# Patient Record
Sex: FEMALE | Race: WHITE | Employment: FULL TIME | URBAN - NONMETROPOLITAN AREA
[De-identification: names, ages, dates, MRNs, and addresses within clinical notes are randomized per-mention and may not be internally consistent; named-entity substitution may affect disease eponyms.]

---

## 2019-05-17 LAB — MAMMOGRAPHY, EXTERNAL: NORMAL

## 2021-09-02 ENCOUNTER — TELEPHONE (OUTPATIENT)
Dept: PRIMARY CARE CLINIC | Age: 51
End: 2021-09-02

## 2021-09-02 ENCOUNTER — OFFICE VISIT (OUTPATIENT)
Dept: PRIMARY CARE CLINIC | Age: 51
End: 2021-09-02
Payer: COMMERCIAL

## 2021-09-02 VITALS
RESPIRATION RATE: 16 BRPM | HEART RATE: 68 BPM | DIASTOLIC BLOOD PRESSURE: 80 MMHG | SYSTOLIC BLOOD PRESSURE: 126 MMHG | TEMPERATURE: 97.5 F | WEIGHT: 293 LBS | HEIGHT: 63 IN | OXYGEN SATURATION: 98 % | BODY MASS INDEX: 51.91 KG/M2

## 2021-09-02 DIAGNOSIS — I10 ESSENTIAL HYPERTENSION: ICD-10-CM

## 2021-09-02 DIAGNOSIS — E03.9 ACQUIRED HYPOTHYROIDISM: ICD-10-CM

## 2021-09-02 DIAGNOSIS — M19.90 ARTHRITIS: ICD-10-CM

## 2021-09-02 DIAGNOSIS — E04.1 THYROID NODULE: ICD-10-CM

## 2021-09-02 DIAGNOSIS — N92.4 EXCESSIVE BLEEDING IN PREMENOPAUSAL PERIOD: ICD-10-CM

## 2021-09-02 DIAGNOSIS — R60.0 LOCALIZED EDEMA: ICD-10-CM

## 2021-09-02 DIAGNOSIS — Z98.890 HX OF LEFT BREAST BIOPSY: ICD-10-CM

## 2021-09-02 DIAGNOSIS — F41.9 ANXIETY: Primary | ICD-10-CM

## 2021-09-02 PROCEDURE — 99204 OFFICE O/P NEW MOD 45 MIN: CPT | Performed by: INTERNAL MEDICINE

## 2021-09-02 PROCEDURE — 93000 ELECTROCARDIOGRAM COMPLETE: CPT | Performed by: INTERNAL MEDICINE

## 2021-09-02 RX ORDER — LISINOPRIL AND HYDROCHLOROTHIAZIDE 25; 20 MG/1; MG/1
1 TABLET ORAL DAILY
COMMUNITY
Start: 2021-07-28 | End: 2021-09-02 | Stop reason: SDUPTHER

## 2021-09-02 RX ORDER — LEVOTHYROXINE SODIUM 0.12 MG/1
125 TABLET ORAL
COMMUNITY
Start: 2021-06-16 | End: 2021-09-02 | Stop reason: SDUPTHER

## 2021-09-02 RX ORDER — LISINOPRIL AND HYDROCHLOROTHIAZIDE 25; 20 MG/1; MG/1
1 TABLET ORAL DAILY
Qty: 90 TABLET | Refills: 1 | Status: SHIPPED
Start: 2021-09-02 | End: 2022-03-01 | Stop reason: SDUPTHER

## 2021-09-02 RX ORDER — LEVOTHYROXINE SODIUM 0.12 MG/1
125 TABLET ORAL
Qty: 90 TABLET | Refills: 1 | Status: SHIPPED
Start: 2021-09-02 | End: 2022-03-01 | Stop reason: SDUPTHER

## 2021-09-02 RX ORDER — NAPROXEN SODIUM 220 MG
220 TABLET ORAL DAILY PRN
COMMUNITY
End: 2022-03-01 | Stop reason: SDUPTHER

## 2021-09-02 SDOH — ECONOMIC STABILITY: TRANSPORTATION INSECURITY
IN THE PAST 12 MONTHS, HAS LACK OF TRANSPORTATION KEPT YOU FROM MEETINGS, WORK, OR FROM GETTING THINGS NEEDED FOR DAILY LIVING?: NO

## 2021-09-02 SDOH — ECONOMIC STABILITY: FOOD INSECURITY: WITHIN THE PAST 12 MONTHS, THE FOOD YOU BOUGHT JUST DIDN'T LAST AND YOU DIDN'T HAVE MONEY TO GET MORE.: NEVER TRUE

## 2021-09-02 SDOH — ECONOMIC STABILITY: FOOD INSECURITY: WITHIN THE PAST 12 MONTHS, YOU WORRIED THAT YOUR FOOD WOULD RUN OUT BEFORE YOU GOT MONEY TO BUY MORE.: NEVER TRUE

## 2021-09-02 SDOH — ECONOMIC STABILITY: HOUSING INSECURITY: IN THE LAST 12 MONTHS, HOW MANY PLACES HAVE YOU LIVED?: 2

## 2021-09-02 SDOH — ECONOMIC STABILITY: HOUSING INSECURITY
IN THE LAST 12 MONTHS, WAS THERE A TIME WHEN YOU DID NOT HAVE A STEADY PLACE TO SLEEP OR SLEPT IN A SHELTER (INCLUDING NOW)?: NO

## 2021-09-02 SDOH — ECONOMIC STABILITY: TRANSPORTATION INSECURITY
IN THE PAST 12 MONTHS, HAS THE LACK OF TRANSPORTATION KEPT YOU FROM MEDICAL APPOINTMENTS OR FROM GETTING MEDICATIONS?: NO

## 2021-09-02 SDOH — ECONOMIC STABILITY: INCOME INSECURITY: IN THE LAST 12 MONTHS, WAS THERE A TIME WHEN YOU WERE NOT ABLE TO PAY THE MORTGAGE OR RENT ON TIME?: NO

## 2021-09-02 SDOH — HEALTH STABILITY: PHYSICAL HEALTH: ON AVERAGE, HOW MANY DAYS PER WEEK DO YOU ENGAGE IN MODERATE TO STRENUOUS EXERCISE (LIKE A BRISK WALK)?: 0 DAYS

## 2021-09-02 SDOH — HEALTH STABILITY: PHYSICAL HEALTH: ON AVERAGE, HOW MANY MINUTES DO YOU ENGAGE IN EXERCISE AT THIS LEVEL?: 0 MIN

## 2021-09-02 ASSESSMENT — PATIENT HEALTH QUESTIONNAIRE - PHQ9
1. LITTLE INTEREST OR PLEASURE IN DOING THINGS: 0
SUM OF ALL RESPONSES TO PHQ9 QUESTIONS 1 & 2: 0
SUM OF ALL RESPONSES TO PHQ QUESTIONS 1-9: 0
2. FEELING DOWN, DEPRESSED OR HOPELESS: 0
DEPRESSION UNABLE TO ASSESS: FUNCTIONAL CAPACITY MOTIVATION LIMITS ACCURACY
SUM OF ALL RESPONSES TO PHQ QUESTIONS 1-9: 0
SUM OF ALL RESPONSES TO PHQ QUESTIONS 1-9: 0

## 2021-09-02 ASSESSMENT — SOCIAL DETERMINANTS OF HEALTH (SDOH)
WITHIN THE LAST YEAR, HAVE TO BEEN RAPED OR FORCED TO HAVE ANY KIND OF SEXUAL ACTIVITY BY YOUR PARTNER OR EX-PARTNER?: NO
HOW OFTEN DO YOU ATTENT MEETINGS OF THE CLUB OR ORGANIZATION YOU BELONG TO?: NEVER
WITHIN THE LAST YEAR, HAVE YOU BEEN HUMILIATED OR EMOTIONALLY ABUSED IN OTHER WAYS BY YOUR PARTNER OR EX-PARTNER?: NO
DO YOU BELONG TO ANY CLUBS OR ORGANIZATIONS SUCH AS CHURCH GROUPS UNIONS, FRATERNAL OR ATHLETIC GROUPS, OR SCHOOL GROUPS?: NO
IN A TYPICAL WEEK, HOW MANY TIMES DO YOU TALK ON THE PHONE WITH FAMILY, FRIENDS, OR NEIGHBORS?: MORE THAN THREE TIMES A WEEK
HOW HARD IS IT FOR YOU TO PAY FOR THE VERY BASICS LIKE FOOD, HOUSING, MEDICAL CARE, AND HEATING?: NOT HARD AT ALL
HOW OFTEN DO YOU ATTEND CHURCH OR RELIGIOUS SERVICES?: 1 TO 4 TIMES PER YEAR
WITHIN THE LAST YEAR, HAVE YOU BEEN KICKED, HIT, SLAPPED, OR OTHERWISE PHYSICALLY HURT BY YOUR PARTNER OR EX-PARTNER?: NO
WITHIN THE LAST YEAR, HAVE YOU BEEN AFRAID OF YOUR PARTNER OR EX-PARTNER?: NO
HOW OFTEN DO YOU GET TOGETHER WITH FRIENDS OR RELATIVES?: NEVER

## 2021-09-02 ASSESSMENT — LIFESTYLE VARIABLES
HOW OFTEN DO YOU HAVE A DRINK CONTAINING ALCOHOL: MONTHLY OR LESS
HOW MANY STANDARD DRINKS CONTAINING ALCOHOL DO YOU HAVE ON A TYPICAL DAY: 3 OR 4

## 2021-09-02 ASSESSMENT — ENCOUNTER SYMPTOMS
RESPIRATORY NEGATIVE: 1
ALLERGIC/IMMUNOLOGIC NEGATIVE: 1
EYES NEGATIVE: 1
GASTROINTESTINAL NEGATIVE: 1

## 2021-09-02 NOTE — TELEPHONE ENCOUNTER
Pt forgot to ask for a referral to a dermatologist for skin tags. Wanted to know if you can also put in for that.

## 2021-09-02 NOTE — PROGRESS NOTES
2021    Isabelle Guardado (:  1970) is a 46 y.o. female, here for evaluation of the following medical concerns: This is a new patient to my practice today. Patient does have a history of hypertension since the age of [de-identified] and hypothyroidism treated since age of [de-identified]. She began having treatment for osteoarthritis especially of the right knee at age 50. Very minimal surgical history. She did see an orthopedic surgeon previously for the knee. Patient did have a breast biopsy but was somewhat traumatized by this. This was done back in 2019. I will need to look at these records. She states occasionally at the biopsy site she still has twinges of discomfort. She did not have thyroid work-up done when she was [age]. She was just placed on medication. Review of Systems   Constitutional: Negative. HENT: Negative. Eyes: Negative. Respiratory: Negative. Cardiovascular: Negative. Gastrointestinal: Negative. Endocrine: Negative. Genitourinary: Negative. Musculoskeletal:        Occasional right knee pain. Relief with Aleve. Skin: Negative. Allergic/Immunologic: Negative. Neurological: Negative. Hematological: Negative. Psychiatric/Behavioral: Negative. Prior to Visit Medications    Medication Sig Taking? Authorizing Provider   naproxen sodium (ANAPROX) 220 MG tablet Take 220 mg by mouth daily as needed Yes Historical Provider, MD   levothyroxine (SYNTHROID) 125 MCG tablet Take 1 tablet by mouth every morning (before breakfast) Take 125 mcg by mouth every morning (before breakfast) Yes Roberto Lennon MD   lisinopril-hydroCHLOROthiazide (PRINZIDE;ZESTORETIC) 20-25 MG per tablet Take 1 tablet by mouth daily Yes Roberto Lennon MD        Allergies   Allergen Reactions    Amoxicillin      Other reaction(s): Intolerance    Penicillin G      Other reaction(s): Intolerance       No past medical history on file.     No past surgical history on file. Social History     Socioeconomic History    Marital status:      Spouse name: Not on file    Number of children: Not on file    Years of education: Not on file    Highest education level: Not on file   Occupational History    Not on file   Tobacco Use    Smoking status: Never Smoker    Smokeless tobacco: Never Used   Substance and Sexual Activity    Alcohol use: Not Currently     Alcohol/week: 7.0 standard drinks     Types: 1 Cans of beer, 6 Shots of liquor per week    Drug use: Never    Sexual activity: Not on file   Other Topics Concern    Not on file   Social History Narrative    Not on file     Social Determinants of Health     Financial Resource Strain: Low Risk     Difficulty of Paying Living Expenses: Not hard at all   Food Insecurity: No Food Insecurity    Worried About [address] in the Last Year: Never true    [address] [employer] in the Last Year: Never true   Transportation Needs: No Transportation Needs    Lack of Transportation (Medical): No    Lack of Transportation (Non-Medical): No   Physical Activity: Inactive    Days of Exercise per Week: 0 days    Minutes of Exercise per Session: 0 min   Stress: Stress Concern Present    Feeling of Stress : Very much   Social Connections: Moderately Integrated    Frequency of Communication with Friends and Family: More than three times a week    Frequency of Social Gatherings with Friends and Family: Never    Attends Voodoo Services: 1 to 4 times per year    Active Member of [employer] or Organizations: No    Attends Club or Organization Meetings: Never    Marital Status:    Intimate Partner Violence: Not At Risk    Fear of Current or Ex-Partner: No    Emotionally Abused: No    Physically Abused: No    Sexually Abused: No        No family history on file.     Vitals:    09/02/21 1032   BP: 126/80   Pulse: 68   Resp: 16   Temp: 97.5 °F (36.4 °C)   TempSrc: Temporal   SpO2: 98%   Weight: 300 lb (136.1 kg) Height: 5' 3\" (1.6 m)     Estimated body mass index is 53.14 kg/m² as calculated from the following:    Height as of this encounter: 5' 3\" (1.6 m). Weight as of this encounter: 300 lb (136.1 kg). Physical Exam  Constitutional:       Appearance: She is obese. HENT:      Head: Normocephalic and atraumatic. Right Ear: Tympanic membrane, ear canal and external ear normal.      Left Ear: Tympanic membrane, ear canal and external ear normal.      Nose: Nose normal.   Eyes:      Extraocular Movements: Extraocular movements intact. Conjunctiva/sclera: Conjunctivae normal.      Pupils: Pupils are equal, round, and reactive to light. Neck:      Comments: Questionable thyroid nodule on the right. This approximately 1 cm in diameter. Cardiovascular:      Rate and Rhythm: Normal rate and regular rhythm. Heart sounds: Normal heart sounds. Pulmonary:      Breath sounds: Normal breath sounds. Abdominal:      General: Bowel sounds are normal.      Tenderness: There is no abdominal tenderness. Musculoskeletal:         General: Normal range of motion. Cervical back: Normal range of motion and neck supple. Skin:     General: Skin is warm and dry. Comments: Trace to 1+ edema below the mid tibia bilaterally   Neurological:      General: No focal deficit present. Psychiatric:      Comments: Slightly animated. ASSESSMENT/PLAN:  Gerardo Friedman was seen today for establish care. Diagnoses and all orders for this visit:    Anxiety  -     EKG 12 lead; Future  -     EKG 12 lead    Essential hypertension  -     TSH without Reflex; Future  -     Comprehensive Metabolic Panel; Future  -     Lipid Panel; Future  -     CBC Auto Differential; Future  -     Iron and TIBC; Future  -     Ferritin; Future  -     EKG 12 lead; Future  -     EKG 12 lead    Arthritis  -     TSH without Reflex; Future  -     Comprehensive Metabolic Panel; Future  -     Lipid Panel;  Future  -     CBC Auto Differential; Future  -     Iron and TIBC; Future  -     Ferritin; Future    Acquired hypothyroidism  -     TSH without Reflex; Future  -     Comprehensive Metabolic Panel; Future  -     Lipid Panel; Future  -     CBC Auto Differential; Future  -     Iron and TIBC; Future  -     Ferritin; Future    Excessive bleeding in premenopausal period  -     TSH without Reflex; Future  -     Comprehensive Metabolic Panel; Future  -     Lipid Panel; Future  -     CBC Auto Differential; Future  -     Iron and TIBC; Future  -     Ferritin; Future    Thyroid nodule  -     US THYROID; Future  -     TSH without Reflex; Future  -     Comprehensive Metabolic Panel; Future  -     Lipid Panel; Future  -     CBC Auto Differential; Future  -     Iron and TIBC; Future  -     Ferritin; Future    Localized edema  -     TSH without Reflex; Future  -     Comprehensive Metabolic Panel; Future  -     Lipid Panel; Future  -     CBC Auto Differential; Future  -     Iron and TIBC; Future  -     Ferritin; Future    Hx of left breast biopsy    Other orders  -     levothyroxine (SYNTHROID) 125 MCG tablet; Take 1 tablet by mouth every morning (before breakfast) Take 125 mcg by mouth every morning (before breakfast)  -     lisinopril-hydroCHLOROthiazide (PRINZIDE;ZESTORETIC) 20-25 MG per tablet; Take 1 tablet by mouth daily       Patient is to have an EKG today. The patient will also have thyroid ultrasound. Lab work will be obtained today. A letter know if we need to make changes. We will need to get her old records and investigate this left breast biopsy. She is fairly adamant that she had once no further biopsies were testing but this will need to be reconsidered in the future. Return in about 2 months (around 11/2/2021). An  electronic signature was used to authenticate this note.     --Irma Milton MD on 9/2/2021 at 12:02 PM

## 2021-09-03 DIAGNOSIS — L91.8 CUTANEOUS SKIN TAGS: Primary | ICD-10-CM

## 2022-03-01 ENCOUNTER — OFFICE VISIT (OUTPATIENT)
Dept: PRIMARY CARE CLINIC | Age: 52
End: 2022-03-01
Payer: COMMERCIAL

## 2022-03-01 VITALS
WEIGHT: 293 LBS | OXYGEN SATURATION: 97 % | RESPIRATION RATE: 16 BRPM | HEIGHT: 63 IN | SYSTOLIC BLOOD PRESSURE: 138 MMHG | DIASTOLIC BLOOD PRESSURE: 80 MMHG | HEART RATE: 69 BPM | TEMPERATURE: 97.7 F | BODY MASS INDEX: 51.91 KG/M2

## 2022-03-01 DIAGNOSIS — E03.9 ACQUIRED HYPOTHYROIDISM: ICD-10-CM

## 2022-03-01 DIAGNOSIS — E78.2 MIXED HYPERLIPIDEMIA: ICD-10-CM

## 2022-03-01 DIAGNOSIS — I10 ESSENTIAL HYPERTENSION: Primary | ICD-10-CM

## 2022-03-01 PROBLEM — E66.813 OBESITY, CLASS III, BMI 40-49.9 (MORBID OBESITY) (HCC): Status: ACTIVE | Noted: 2019-06-05

## 2022-03-01 PROBLEM — E66.01 OBESITY, CLASS III, BMI 40-49.9 (MORBID OBESITY) (HCC): Status: ACTIVE | Noted: 2019-06-05

## 2022-03-01 PROBLEM — D24.2 FIBROADENOMA, LEFT: Status: ACTIVE | Noted: 2019-06-18

## 2022-03-01 PROCEDURE — 99213 OFFICE O/P EST LOW 20 MIN: CPT | Performed by: NURSE PRACTITIONER

## 2022-03-01 RX ORDER — PROMETHAZINE HYDROCHLORIDE 12.5 MG/1
12.5 TABLET ORAL 3 TIMES DAILY PRN
Qty: 12 TABLET | Refills: 0 | Status: SHIPPED | OUTPATIENT
Start: 2022-03-01 | End: 2022-03-08

## 2022-03-01 RX ORDER — LISINOPRIL AND HYDROCHLOROTHIAZIDE 25; 20 MG/1; MG/1
1 TABLET ORAL DAILY
Qty: 90 TABLET | Refills: 1 | Status: SHIPPED
Start: 2022-03-01 | End: 2022-09-08 | Stop reason: SDUPTHER

## 2022-03-01 RX ORDER — NAPROXEN SODIUM 220 MG
220 TABLET ORAL DAILY PRN
Qty: 60 TABLET | Refills: 3 | Status: SHIPPED | OUTPATIENT
Start: 2022-03-01

## 2022-03-01 RX ORDER — LEVOTHYROXINE SODIUM 0.12 MG/1
125 TABLET ORAL
Qty: 90 TABLET | Refills: 1 | Status: SHIPPED
Start: 2022-03-01 | End: 2022-09-08 | Stop reason: SDUPTHER

## 2022-03-01 ASSESSMENT — ENCOUNTER SYMPTOMS
GASTROINTESTINAL NEGATIVE: 1
EYES NEGATIVE: 1
ALLERGIC/IMMUNOLOGIC NEGATIVE: 1
RESPIRATORY NEGATIVE: 1

## 2022-03-01 NOTE — PROGRESS NOTES
3/1/2022    Nathalia Newman (:  1970) is a 46 y.o. female, here for evaluation of the following medical concerns: The patient presents for follow-up. She established with Dr. Karol Noe about 3 months ago. Unfortunately, we did not obtain the biopsy report of the breast, so record release will be sent once more. She did not obtain thyroid ultrasound but states she is planning to do this when it is financially feasible. After her office visit, lab work did show hyperlipidemia. The patient was advised to work on weight loss and Mediterranean diet. She states that she has not done this. She would also like to recheck her lipids fasting and we discussed this today. I will place an order for her. Overall, she does not have any new complaints. However, she states that she has previously had a standing order of Phenergan to take on an as-needed basis. She states occasionally when she eats salads she has significant abdominal cramping which keeps her up through the night. Previously, Phenergan has been given to her. She uses this very rarely. Review of Systems   Constitutional: Negative. HENT: Negative. Eyes: Negative. Respiratory: Negative. Cardiovascular: Negative. Gastrointestinal: Negative. Endocrine: Negative. Genitourinary: Negative. Musculoskeletal:        Occasional right knee pain. Relief with Aleve. Skin: Negative. Allergic/Immunologic: Negative. Neurological: Negative. Hematological: Negative. Psychiatric/Behavioral: Negative. Prior to Visit Medications    Medication Sig Taking?  Authorizing Provider   diphenhydrAMINE-APAP, sleep, (TYLENOL PM EXTRA STRENGTH PO) Take 500 mg by mouth Take one tablet PO nightly Yes Historical Provider, MD   lisinopril-hydroCHLOROthiazide (PRINZIDE;ZESTORETIC) 20-25 MG per tablet Take 1 tablet by mouth daily Yes TalalaHUMZA Tafoya - CNP   levothyroxine (SYNTHROID) 125 MCG tablet Take 1 tablet by mouth every morning (before breakfast) Take 125 mcg by mouth every morning (before breakfast) Yes Ethel HUMZA Ayala CNP   naproxen sodium (ANAPROX) 220 MG tablet Take 1 tablet by mouth daily as needed for Pain Yes Victorinoheidi HUMZA Ayala CNP   promethazine (PHENERGAN) 12.5 MG tablet Take 1 tablet by mouth 3 times daily as needed for Nausea Yes HUMZA Lynn CNP        Allergies   Allergen Reactions    Amoxicillin      Other reaction(s): Intolerance    Penicillin G      Other reaction(s): Intolerance       Past Medical History:   Diagnosis Date    Acquired hypothyroidism     Arthritis 2018    Class 3 severe obesity due to excess calories without serious comorbidity with body mass index (BMI) of 50.0 to 59.9 in adult [facility])     Essential hypertension     H/O breast biopsy        Past Surgical History:   Procedure Laterality Date    INDUCED   1985    WISDOM TOOTH EXTRACTION         Social History     Socioeconomic History    Marital status:      Spouse name: Not on file    Number of children: Not on file    Years of education: Not on file    Highest education level: Not on file   Occupational History    Not on file   Tobacco Use    Smoking status: Never Smoker    Smokeless tobacco: Never Used   Substance and Sexual Activity    Alcohol use: Not Currently     Alcohol/week: 7.0 standard drinks     Types: 1 Cans of beer, 6 Shots of liquor per week    Drug use: Never    Sexual activity: Not on file   Other Topics Concern    Not on file   Social History Narrative    Not on file     Social Determinants of Health     Financial Resource Strain: Low Risk     Difficulty of Paying Living Expenses: Not hard at all   Food Insecurity: No Food Insecurity    Worried About Running Out of Food in the Last Year: Never true    [address] in the Last Year: Never true   Transportation Needs: No Transportation Needs    Lack of Transportation (Medical):  No    Lack of Transportation (Non-Medical): No   Physical Activity: Inactive    Days of Exercise per Week: 0 days    Minutes of Exercise per Session: 0 min   Stress: Stress Concern Present    Feeling of Stress : Very much   Social Connections: Moderately Integrated    Frequency of Communication with Friends and Family: More than three times a week    Frequency of Social Gatherings with Friends and Family: Never    Attends [employer] Services: 1 to 4 times per year    Active Member of [address] [place] [address] [employer] or Organizations: No    Attends Club or Organization Meetings: Never    Marital Status:    Intimate Partner Violence: Not At Risk    Fear of Current or Ex-Partner: No    Emotionally Abused: No    Physically Abused: No    Sexually Abused: No   Housing Stability: Low Risk     Unable to Pay for Housing in the Last Year: No    Number of [place] in the Last Year: 2    Unstable Housing in the Last Year: No        Family History   Problem Relation Age of Onset    Hypertension Mother     Cancer Mother         BREAST    Heart Attack Father 54            Diabetes Father        Vitals:    22 1546   BP: 138/80   Pulse: 69   Resp: 16   Temp: 97.7 °F (36.5 °C)   SpO2: 97%   Weight: (!) 302 lb (137 kg)   Height: 5' 3\" (1.6 m)     Estimated body mass index is 53.5 kg/m² as calculated from the following:    Height as of this encounter: 5' 3\" (1.6 m). Weight as of this encounter: 302 lb (137 kg). Physical Exam  Constitutional:       Appearance: She is obese. HENT:      Head: Normocephalic and atraumatic. Eyes:      Conjunctiva/sclera: Conjunctivae normal.   Neck:      Comments: Questionable thyroid nodule on the right. This approximately 1 cm in diameter. Cardiovascular:      Rate and Rhythm: Normal rate and regular rhythm. Heart sounds: Normal heart sounds. Pulmonary:      Breath sounds: Normal breath sounds. Musculoskeletal:         General: Normal range of motion.       Cervical back: Normal range of motion and neck supple. Skin:     General: Skin is warm and dry. Comments: Trace to 1+ edema below the mid tibia bilaterally   Neurological:      General: No focal deficit present. Psychiatric:      Comments: Slightly animated. ASSESSMENT/PLAN:  Michelle Rg was seen today for medication refill. Diagnoses and all orders for this visit:    Essential hypertension    Acquired hypothyroidism    Mixed hyperlipidemia  -     Lipid Panel; Future  -     Comprehensive Metabolic Panel; Future    Other orders  -     lisinopril-hydroCHLOROthiazide (PRINZIDE;ZESTORETIC) 20-25 MG per tablet; Take 1 tablet by mouth daily  -     levothyroxine (SYNTHROID) 125 MCG tablet; Take 1 tablet by mouth every morning (before breakfast) Take 125 mcg by mouth every morning (before breakfast)  -     naproxen sodium (ANAPROX) 220 MG tablet; Take 1 tablet by mouth daily as needed for Pain  -     promethazine (PHENERGAN) 12.5 MG tablet; Take 1 tablet by mouth 3 times daily as needed for Nausea      Fasting labs are to be completed. Records from the breast biopsy will be obtained, I did give her a short course of Phenergan to use on an as-needed basis. She is to let us know if any new symptoms develop. She is to be seen back in 6 months for regular office visit. At that point in time, colon cancer screening will continue to be discussed. Return in about 6 months (around 9/1/2022) for regular office visit. An  electronic signature was used to authenticate this note.     --HUMZA Sommers - CNP on 3/1/2022 at 4:26 PM

## 2022-09-08 ENCOUNTER — OFFICE VISIT (OUTPATIENT)
Dept: PRIMARY CARE CLINIC | Age: 52
End: 2022-09-08
Payer: COMMERCIAL

## 2022-09-08 VITALS
HEIGHT: 63 IN | HEART RATE: 74 BPM | BODY MASS INDEX: 51.91 KG/M2 | WEIGHT: 293 LBS | SYSTOLIC BLOOD PRESSURE: 118 MMHG | OXYGEN SATURATION: 97 % | DIASTOLIC BLOOD PRESSURE: 62 MMHG | TEMPERATURE: 97.4 F

## 2022-09-08 DIAGNOSIS — E03.9 ACQUIRED HYPOTHYROIDISM: ICD-10-CM

## 2022-09-08 DIAGNOSIS — I10 ESSENTIAL HYPERTENSION: Primary | ICD-10-CM

## 2022-09-08 DIAGNOSIS — M19.90 ARTHRITIS: ICD-10-CM

## 2022-09-08 DIAGNOSIS — G89.29 CHRONIC PAIN OF RIGHT KNEE: ICD-10-CM

## 2022-09-08 DIAGNOSIS — G47.33 OBSTRUCTIVE SLEEP APNEA SYNDROME: ICD-10-CM

## 2022-09-08 DIAGNOSIS — N64.4 BREAST PAIN, LEFT: ICD-10-CM

## 2022-09-08 DIAGNOSIS — E78.2 MIXED HYPERLIPIDEMIA: ICD-10-CM

## 2022-09-08 DIAGNOSIS — M25.561 CHRONIC PAIN OF RIGHT KNEE: ICD-10-CM

## 2022-09-08 PROBLEM — E66.813 CLASS 3 SEVERE OBESITY DUE TO EXCESS CALORIES WITH SERIOUS COMORBIDITY AND BODY MASS INDEX (BMI) OF 50.0 TO 59.9 IN ADULT: Status: ACTIVE | Noted: 2019-06-05

## 2022-09-08 PROCEDURE — 99214 OFFICE O/P EST MOD 30 MIN: CPT | Performed by: INTERNAL MEDICINE

## 2022-09-08 RX ORDER — PROMETHAZINE HYDROCHLORIDE 12.5 MG/1
12.5 TABLET ORAL EVERY 8 HOURS PRN
COMMUNITY

## 2022-09-08 RX ORDER — LISINOPRIL AND HYDROCHLOROTHIAZIDE 25; 20 MG/1; MG/1
1 TABLET ORAL DAILY
Qty: 90 TABLET | Refills: 1 | Status: SHIPPED | OUTPATIENT
Start: 2022-09-08 | End: 2022-12-07

## 2022-09-08 RX ORDER — LEVOTHYROXINE SODIUM 0.12 MG/1
125 TABLET ORAL
Qty: 90 TABLET | Refills: 1 | Status: SHIPPED | OUTPATIENT
Start: 2022-09-08 | End: 2022-12-07

## 2022-09-08 RX ORDER — CYCLOBENZAPRINE HCL 5 MG
5 TABLET ORAL PRN
COMMUNITY

## 2022-09-08 SDOH — ECONOMIC STABILITY: FOOD INSECURITY: WITHIN THE PAST 12 MONTHS, THE FOOD YOU BOUGHT JUST DIDN'T LAST AND YOU DIDN'T HAVE MONEY TO GET MORE.: NEVER TRUE

## 2022-09-08 SDOH — ECONOMIC STABILITY: FOOD INSECURITY: WITHIN THE PAST 12 MONTHS, YOU WORRIED THAT YOUR FOOD WOULD RUN OUT BEFORE YOU GOT MONEY TO BUY MORE.: NEVER TRUE

## 2022-09-08 ASSESSMENT — ANXIETY QUESTIONNAIRES
1. FEELING NERVOUS, ANXIOUS, OR ON EDGE: 0
2. NOT BEING ABLE TO STOP OR CONTROL WORRYING: NEARLY EVERY DAY
3. WORRYING TOO MUCH ABOUT DIFFERENT THINGS: NEARLY EVERY DAY
5. BEING SO RESTLESS THAT IT IS HARD TO SIT STILL: 0
3. WORRYING TOO MUCH ABOUT DIFFERENT THINGS: 3
IF YOU CHECKED OFF ANY PROBLEMS ON THIS QUESTIONNAIRE, HOW DIFFICULT HAVE THESE PROBLEMS MADE IT FOR YOU TO DO YOUR WORK, TAKE CARE OF THINGS AT HOME, OR GET ALONG WITH OTHER PEOPLE: NOT DIFFICULT AT ALL
2. NOT BEING ABLE TO STOP OR CONTROL WORRYING: 3
1. FEELING NERVOUS, ANXIOUS, OR ON EDGE: NOT AT ALL
6. BECOMING EASILY ANNOYED OR IRRITABLE: 0
6. BECOMING EASILY ANNOYED OR IRRITABLE: NOT AT ALL
7. FEELING AFRAID AS IF SOMETHING AWFUL MIGHT HAPPEN: 3
GAD7 TOTAL SCORE: 9
4. TROUBLE RELAXING: 0
4. TROUBLE RELAXING: NOT AT ALL
5. BEING SO RESTLESS THAT IT IS HARD TO SIT STILL: NOT AT ALL
7. FEELING AFRAID AS IF SOMETHING AWFUL MIGHT HAPPEN: NEARLY EVERY DAY
IF YOU CHECKED OFF ANY PROBLEMS ON THIS QUESTIONNAIRE, HOW DIFFICULT HAVE THESE PROBLEMS MADE IT FOR YOU TO DO YOUR WORK, TAKE CARE OF THINGS AT HOME, OR GET ALONG WITH OTHER PEOPLE: NOT DIFFICULT AT ALL

## 2022-09-08 ASSESSMENT — PATIENT HEALTH QUESTIONNAIRE - PHQ9
SUM OF ALL RESPONSES TO PHQ9 QUESTIONS 1 & 2: 0
SUM OF ALL RESPONSES TO PHQ QUESTIONS 1-9: 0
SUM OF ALL RESPONSES TO PHQ QUESTIONS 1-9: 0
1. LITTLE INTEREST OR PLEASURE IN DOING THINGS: 0
SUM OF ALL RESPONSES TO PHQ QUESTIONS 1-9: 0
SUM OF ALL RESPONSES TO PHQ QUESTIONS 1-9: 0
2. FEELING DOWN, DEPRESSED OR HOPELESS: 0

## 2022-09-08 ASSESSMENT — LIFESTYLE VARIABLES
HOW MANY STANDARD DRINKS CONTAINING ALCOHOL DO YOU HAVE ON A TYPICAL DAY: 1 OR 2
HOW OFTEN DO YOU HAVE A DRINK CONTAINING ALCOHOL: 2-4 TIMES A MONTH
HOW MANY STANDARD DRINKS CONTAINING ALCOHOL DO YOU HAVE ON A TYPICAL DAY: 1
HOW OFTEN DO YOU HAVE SIX OR MORE DRINKS ON ONE OCCASION: 1
HOW OFTEN DO YOU HAVE A DRINK CONTAINING ALCOHOL: 3

## 2022-09-08 ASSESSMENT — ENCOUNTER SYMPTOMS
WHEEZING: 0
ALLERGIC/IMMUNOLOGIC NEGATIVE: 1
SHORTNESS OF BREATH: 0
GASTROINTESTINAL NEGATIVE: 1
APNEA: 1
EYES NEGATIVE: 1

## 2022-09-08 ASSESSMENT — SOCIAL DETERMINANTS OF HEALTH (SDOH): HOW HARD IS IT FOR YOU TO PAY FOR THE VERY BASICS LIKE FOOD, HOUSING, MEDICAL CARE, AND HEATING?: NOT HARD AT ALL

## 2022-09-08 NOTE — PROGRESS NOTES
2022    Anne Zheng (:  1970) is a 46 y.o. female, here for evaluation of the following medical concerns:    Patient has multiple issues today. Patient about 6 weeks injured her right knee in a fall from a ladder. She was painting and had stretched and fell. In that same motion she also injured her right wrist.  She was seen in acute care facility. X-rays were done and she was given a steroid injection. She still continues to have swelling in that right knee. Patient previously was evaluated by orthopedic surgery in [facility] was told she had loose bodies and significant arthritis. It was felt that she was too young to have any type of joint replacement. She was taking as needed Aleve previously. Patient also is noted over the last year that she has had several episodes of waking up in the night gasping for air. She denies any morning headaches. She denies hypersomnolence. She does have relatives with sleep apnea. Patient also previously had refused mammography. She states that she had a mammogram about 10 years ago and since that period of time that she has had breast discomfort especially around her nipples. She refuses to get a mammogram based on this fear. Unfortunately the patient also is complaining of left breast pain. She states this is over the lower outer quadrant of the breast.  She states that she does not feel any palpable mass. We did discuss work-up of the symptoms including referral to Dr. Vy Patterson. Patient will also need referral to orthopedic surgery regarding this continued right knee discomfort with swelling. Review of Systems   Constitutional: Negative. HENT: Negative. Eyes: Negative. Respiratory:  Positive for apnea. Negative for shortness of breath and wheezing. Cardiovascular: Negative. Gastrointestinal: Negative. Endocrine: Negative. Genitourinary: Negative. Musculoskeletal:         Occasional right knee pain.   Recent right knee injury with continued swelling. Right wrist discomfort over the lateral aspect of the right hand. Skin: Negative. Allergic/Immunologic: Negative. Neurological: Negative. Hematological: Negative. Psychiatric/Behavioral: Negative. Prior to Visit Medications    Medication Sig Taking? Authorizing Provider   promethazine (PHENERGAN) 12.5 MG tablet Take 12.5 mg by mouth every 8 hours as needed for Nausea Yes Historical Provider, MD   cyclobenzaprine (FLEXERIL) 5 MG tablet Take 5 mg by mouth as needed for Muscle spasms Yes Historical Provider, MD   lisinopril-hydroCHLOROthiazide (PRINZIDE;ZESTORETIC) 20-25 MG per tablet Take 1 tablet by mouth daily Yes Velasquez Schafer MD   levothyroxine (SYNTHROID) 125 MCG tablet Take 1 tablet by mouth every morning (before breakfast) Take 125 mcg by mouth every morning (before breakfast) Yes Velasquez Schafer MD   naproxen sodium (ANAPROX) 220 MG tablet Take 1 tablet by mouth daily as needed for Pain Yes HUMZA Pace - FRANCISCA        Allergies   Allergen Reactions    Amoxicillin      Other reaction(s): Intolerance    Penicillin G      Other reaction(s):  Intolerance       Past Medical History:   Diagnosis Date    Acquired hypothyroidism 2007    Arthritis 2018    Class 3 severe obesity due to excess calories without serious comorbidity with body mass index (BMI) of 50.0 to 59.9 in [facility])     Essential hypertension 2007    H/O breast biopsy 2019       Past Surgical History:   Procedure Laterality Date    INDUCED   1985    WISDOM TOOTH EXTRACTION         Social History     Socioeconomic History    Marital status:      Spouse name: Not on file    Number of children: Not on file    Years of education: Not on file    Highest education level: Not on file   Occupational History    Not on file   Tobacco Use    Smoking status: Never    Smokeless tobacco: Never   Substance and Sexual Activity    Alcohol use: Not Currently     Alcohol/week: 7.0 standard drinks     Types: 1 Cans of beer, 6 Shots of liquor per week    Drug use: Never    Sexual activity: Not on file   Other Topics Concern    Not on file   Social History Narrative    Not on file     Social Determinants of Health     Financial Resource Strain: Low Risk     Difficulty of Paying Living Expenses: Not hard at all   Food Insecurity: No Food Insecurity    Worried About Running Out of Food in the Last Year: Never true    Ran Out of Food in the Last Year: Never true   Transportation Needs: Not on file   Physical Activity: Not on file   Stress: Not on file   Social Connections: Not on file   Intimate Partner Violence: Not on file   Housing Stability: Not on file        Family History   Problem Relation Age of Onset    Hypertension Mother     Cancer Mother         BREAST    Heart Attack Father 54            Diabetes Father        Vitals:    22 1523   BP: 118/62   Pulse: 74   Temp: 97.4 °F (36.3 °C)   TempSrc: Temporal   SpO2: 97%   Weight: 297 lb 9.6 oz (135 kg)   Height: 5' 3\" (1.6 m)     Estimated body mass index is 52.72 kg/m² as calculated from the following:    Height as of this encounter: 5' 3\" (1.6 m). Weight as of this encounter: 297 lb 9.6 oz (135 kg). Physical Exam  Constitutional:       Appearance: She is obese. HENT:      Head: Normocephalic and atraumatic. Right Ear: Tympanic membrane, ear canal and external ear normal.      Left Ear: Tympanic membrane, ear canal and external ear normal.      Nose: Nose normal.   Eyes:      Extraocular Movements: Extraocular movements intact. Conjunctiva/sclera: Conjunctivae normal.      Pupils: Pupils are equal, round, and reactive to light. Neck:      Comments: Enlarged neck diameter. Cardiovascular:      Rate and Rhythm: Normal rate and regular rhythm. Heart sounds: Normal heart sounds. Pulmonary:      Breath sounds: Normal breath sounds. Abdominal:      General: Bowel sounds are normal.      Tenderness:  There is no abdominal tenderness. There is no guarding or rebound. Musculoskeletal:         General: Normal range of motion. Cervical back: Normal range of motion and neck supple. Comments: Slight effusion of the right knee. No evidence of Baker's cyst.  Pain over both lateral and medial joint line with flexion. Skin:     General: Skin is warm and dry. Comments: Trace to 1+ edema below the mid tibia bilaterally   Neurological:      General: No focal deficit present. Psychiatric:      Comments: Slightly animated. ASSESSMENT/PLAN:  Nilesh Bartlett was seen today for hypothyroidism. Diagnoses and all orders for this visit:    Essential hypertension    Acquired hypothyroidism  -     TSH; Future    Mixed hyperlipidemia  -     Comprehensive Metabolic Panel; Future    Breast pain, left  -     Carri Mendenhall MD, Breast Surgery, [facility])    Obstructive sleep apnea syndrome  -     CBC with Auto Differential; Future  -     Maria Guadalupe Whitman MD, Sleep Medicine, [facility]    Chronic pain of right knee  -     [facility] - Lyla Beauchamp MD, Orthopaedics, [address]    Other orders  -     lisinopril-hydroCHLOROthiazide (PRINZIDE;ZESTORETIC) 20-25 MG per tablet; Take 1 tablet by mouth daily  -     levothyroxine (SYNTHROID) 125 MCG tablet; Take 1 tablet by mouth every morning (before breakfast) Take 125 mcg by mouth every morning (before breakfast)     Patient is to have lab work today. I have referred her to Dr. Shanti Ortiz for further evaluation of breast pain and breast cancer screening. Her mother does have a history of breast cancer. Patient will be referred to Dr. Jermaine Zimmerman regarding right knee discomfort. She is referred to sleep specialist to rule out possible sleep apnea. Weight loss is encouraged. Patient is to be seen back in 6 months.

## 2022-09-12 ENCOUNTER — TELEPHONE (OUTPATIENT)
Dept: BREAST CENTER | Age: 52
End: 2022-09-12

## 2022-09-12 NOTE — TELEPHONE ENCOUNTER
Called patient as she is a new referral. She states she had here last mammogram 4 or 5 years ago and it ruined her nipples. The pain is terrible and she really does not want another one. I did explain that other than a clinical exam, we would probably need some breast imaging also. Patient in the mean time will obtain her previous mammogram disc from [facility] and call us when she has those so we c have our radiologist review them. 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 Told patient to call when she has the imaging disc and we will plan from there.     Electronically signed by Victorina Tavares RN on 9/12/22 at 12:14 PM EDT

## 2022-09-16 ENCOUNTER — TELEPHONE (OUTPATIENT)
Dept: BREAST CENTER | Age: 52
End: 2022-09-16

## 2022-09-16 NOTE — TELEPHONE ENCOUNTER
Followed up with patient to verify if she brought her disc in. She said she has not had a chance to do that. She plans to call the facility in the next week and bring it in. She will let us know once she brings the disc in.

## 2022-10-10 ENCOUNTER — TELEPHONE (OUTPATIENT)
Dept: BREAST CENTER | Age: 52
End: 2022-10-10

## 2022-10-17 ENCOUNTER — TELEPHONE (OUTPATIENT)
Dept: BREAST CENTER | Age: 52
End: 2022-10-17

## 2022-10-17 NOTE — TELEPHONE ENCOUNTER
Called and left detailed message with call back number in regards to the status of her bringing her imaging disc from [facility].     Electronically signed by Tora Collet, RN on 10/17/22 at 12:07 PM EDT

## 2022-10-27 ENCOUNTER — TELEPHONE (OUTPATIENT)
Dept: BREAST CENTER | Age: 52
End: 2022-10-27

## 2022-10-27 DIAGNOSIS — N64.4 BREAST PAIN: Primary | ICD-10-CM

## 2022-10-27 DIAGNOSIS — N64.59 ABNORMAL BREAST FINDING: ICD-10-CM

## 2022-10-27 NOTE — TELEPHONE ENCOUNTER
Attempt made to follow up with patient in reference to her referral to the breast clinic and retrieving her disc. Left message. Patient returned call shortly after. She states she has not been able to obtain the disc yet. She also states she is sick at this time. She would like to wait until she has off in December to come in. Patient scheduled for 12/27/22 as requested with diagnostic imaging prior. (Bilateral diagnostic mammogram and left breast ultrasound). Last imaging was ~2019 or 2020 per patient, and prior to that was 10 years ago.

## 2022-12-20 ENCOUNTER — COMMUNITY OUTREACH (OUTPATIENT)
Dept: PRIMARY CARE CLINIC | Age: 52
End: 2022-12-20

## 2022-12-27 ENCOUNTER — HOSPITAL ENCOUNTER (OUTPATIENT)
Dept: GENERAL RADIOLOGY | Age: 52
Discharge: HOME | End: 2022-12-29
Payer: COMMERCIAL

## 2022-12-27 ENCOUNTER — OFFICE VISIT (OUTPATIENT)
Dept: BREAST CENTER | Age: 52
End: 2022-12-27
Payer: COMMERCIAL

## 2022-12-27 VITALS
RESPIRATION RATE: 18 BRPM | DIASTOLIC BLOOD PRESSURE: 70 MMHG | WEIGHT: 291 LBS | BODY MASS INDEX: 51.56 KG/M2 | HEIGHT: 63 IN | TEMPERATURE: 98.1 F | HEART RATE: 69 BPM | SYSTOLIC BLOOD PRESSURE: 108 MMHG | OXYGEN SATURATION: 98 %

## 2022-12-27 DIAGNOSIS — Q82.8 ACCESSORY SKIN TAGS: ICD-10-CM

## 2022-12-27 DIAGNOSIS — N64.4 BREAST PAIN: ICD-10-CM

## 2022-12-27 DIAGNOSIS — N64.59 ABNORMAL BREAST FINDING: ICD-10-CM

## 2022-12-27 DIAGNOSIS — N64.4 BREAST PAIN: Primary | ICD-10-CM

## 2022-12-27 PROBLEM — N92.4 EXCESSIVE BLEEDING IN PREMENOPAUSAL PERIOD: Status: RESOLVED | Noted: 2021-09-02 | Resolved: 2022-12-27

## 2022-12-27 PROBLEM — R60.0 LOCALIZED EDEMA: Status: RESOLVED | Noted: 2021-09-02 | Resolved: 2022-12-27

## 2022-12-27 PROCEDURE — 99203 OFFICE O/P NEW LOW 30 MIN: CPT | Performed by: NURSE PRACTITIONER

## 2022-12-27 PROCEDURE — 76642 ULTRASOUND BREAST LIMITED: CPT

## 2022-12-27 PROCEDURE — 3074F SYST BP LT 130 MM HG: CPT | Performed by: NURSE PRACTITIONER

## 2022-12-27 PROCEDURE — 3078F DIAST BP <80 MM HG: CPT | Performed by: NURSE PRACTITIONER

## 2022-12-27 PROCEDURE — 77066 DX MAMMO INCL CAD BI: CPT

## 2022-12-27 RX ORDER — LISINOPRIL AND HYDROCHLOROTHIAZIDE 25; 20 MG/1; MG/1
1 TABLET ORAL DAILY
COMMUNITY

## 2022-12-27 RX ORDER — LIDOCAINE 40 MG/G
CREAM TOPICAL
COMMUNITY
Start: 2022-10-19

## 2022-12-27 RX ORDER — LEVOTHYROXINE SODIUM 112 UG/1
112 TABLET ORAL DAILY
COMMUNITY

## 2022-12-27 ASSESSMENT — ENCOUNTER SYMPTOMS
BACK PAIN: 0
ABDOMINAL PAIN: 0
VOMITING: 0
DIARRHEA: 0
SHORTNESS OF BREATH: 0
CONSTIPATION: 0
NAUSEA: 0
BLOOD IN STOOL: 0
COUGH: 0

## 2022-12-27 NOTE — LETTER
[facility]  [address]  [place] [address]  Phone: [phone]  Fax: [phone]    HUMZA Coreas - FRANCISCA    December 27, 2022     [address] [id number]    Patient: Fernando Hopkins   MR Number: [id number]   YOB: 1970   Date of Visit: 12/27/2022       Dear Dr. Edgardo Hayes: Thank you for referring Jenn Steven to me for evaluation of her left breas discomfort. Below are the relevant portions of my assessment and plan of care. Soha Sanchez is a 46 y.o. extremely pleasant female who is risk for breast cancer include elevated BMI, nulliparity, and mother with breast cancer age 48. 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 She presents today for evaluation of focal area of left breast discomfort at the 6 o'clock position, 3 cm from the nipple. The discomfort has been present for approximately 1 month but she reports it has since started to subside spontaneously. 12/27/2022 a bilateral diagnostic mammogram and left breast ultrasound at [facility]: Negative, BI-RADS 1. During pt's visit today, a [facility] was performed. Karime Santa has a 24.4% lifetime risk (to age 80) of developing breast cancer (high risk). Clinically, her breast exam is unremarkable. We reviewed her breast imaging today for educational (not interpretive) purposes on this visit. We discussed breast anatomy, breast density as assigned by radiology, the bi-rads result, and reviewed the purpose of any biopsy or surgical clips (did not verify placement) noted on imaging. In addition, we discussed any changes on imaging as they relate to post procedure/post treatment.   It was clearly stated to Karime Santa that interpretation of imaging and the final result of imaging is at the discretion of the reading radiologist.     We discussed that I cannot guarantee that she does not have breast cancer now; nor can I guarantee that she won't develop breast cancer in the future. However, there were no concerning findings identified on this visit. We reviewed healthy lifestyle, avoiding alcohol, and BSE in detail. We reviewed symptomatic management for breast pain including wearing a good supportive bra, topical and oral analgesics as needed. We reviewed her Reg Merl risk score in great detail today. We reviewed NCCN guidelines for breast cancer surveillance in women whose Reg Merl score is greater than 20% including, clinical exam every 6 months, annual screening mammogram, and an annual breast MRI. We discussed that she would not be a candidate for breast MRI due to her elevated BMI however, would recommend bilateral complete breast ultrasounds in 6 months as a secondary way of breast surveillance. We also discussed the importance of routine gynecologic exams. She reports it has been greater than 10 years since her last exam.      We reviewed breast self-examination in detail and the importance of calling in the event she would notice any changes. We also reviewed recommendations for bilateral complete breast ultrasounds in July and repeat bilateral screening mammogram in December 2023. After extensive discussion, all questions were answered to her apparent satisfaction. At this time, she is pleasantly declining any further follow-up. She reports she has \"lived a good life \" and is not interested in being proactive about her health. She is also declining referral to gynecology. She is agreeable to referral to dermatology for numerous benign-appearing skin tags. At this time and despite my best attempts, we will see her on an as-needed basis per her request.  She was encouraged to call us at any time should she change her mind and opt to proceed with enhanced surveillance for her underlying breast cancer risks. Plan:      1.  Continue monthly breast self examination; detailed instructions reviewed today. Bring any changes to your physician's attention. 2. Education/Lifestyle recommendations: A regular exercise program is encouraged. Avoid excessive caffeine intake. Maintain a diet rich in vegetables and fruits avoiding processed and fast food. 3. Avoid alcohol or limit to 3 drinks or less per week. 4. Limit caffeine intake. 5. She will be due for her annual repeat screening mammogram in December 2023. Declined offer to schedule on this visit. 6. Continue follow up with Primary Care. 7. Office follow-up visit should be scheduled PRN per her request.     If you have questions, please do not hesitate to call me. I appreciate the opportunity to participate in the care of this ann marie woman. Sincerely,    Lexi Johnston (Rosetta Krabbe) Bonnie Reilly, RN, MSN, APRN-CNP, [address] [facility]  Advanced Oncology Certified Nurse Practitioner  Department of Breast Surgery  [facility] [place]/  Care in collaboration with Dr. Peggy Sanchez/Dr. Cathy Bowser/Dr. Hernesto Gonzáles, APRN - CNP

## 2022-12-27 NOTE — PROGRESS NOTES
Subjective:      HPI    Arnoldo Roche presents for evaluation of left breast pain. PCP: Saint Curlin, MD,  Gynecologist: None. Yany Mcfadden is a 46 y.o. extremely pleasant female who has a complex past medical history including hypertension, elevated BMI, EMILY, hypothyroidism, and arthritis with associated chronic right knee pain. She presents today for evaluation of her left breast discomfort which has been approximately 6 months in duration. She describes the pain as sharp twinges at the site of a prior breast biopsy done at [facility] in 2019 which pathology revealed a fibroadenoma. She reports the pain has actually improved over the past 1 month. Breast cancer risk factors include gender, nulliparity, and mother with breast cancer at age 48. Ashkenazi Spiritism Ancestry: No.  Denies prior thoracic radiation therapy. OBSTETRIC RELATED HISTORY: Menarche age 12. Last menstrual cycle July 2022 (only had a total of 2 cycles in the past 1 year). G1, P0. Denies hormonal therapy. Bra size 40 8C    CANCER SURVEILLANCE HISTORY:  Mammograms: Yes   Breast MRI's: No   Breast Biopsies: Yes left - 2019  Colonoscopy: No.  She has Cologuard kit at home to submit this year. GI Polyps: Not Applicable   EGD: No   Pelvic Exam: Yes - in her early 45s  Pap Smear: Yes   Dermatology: No   Lung screening: no      Estimated body mass index is 51.55 kg/m² as calculated from the following:    Height as of this encounter: 5' 3\" (1.6 m). Weight as of this encounter: 291 lb (132 kg). Bra Size: 48c    Because violence is so common, we ask all our patients: are you in a relationship or do you live with a person who threatens, hurts, or controls you:  patient denies. Patient drinks significant caffeinated beverages (3 cups of coffee/day). Patient does not smoke cigarettes. Patient does not use recreational drugs.       Past Medical History:   Diagnosis Date    Acquired hypothyroidism 2007    Arthritis 2018    Class 3 severe obesity due to excess calories without serious comorbidity with body mass index (BMI) of 50.0 to 59.9 in adult [facility])     Essential hypertension     H/O breast biopsy 2019       Past Surgical History:   Procedure Laterality Date    INDUCED   1985    WISDOM TOOTH EXTRACTION         Current Outpatient Medications   Medication Sig Dispense Refill    lidocaine (LMX) 4 % cream APPLY TOPICALLY 3 TIMES A DAY AS NEEDED. levothyroxine (SYNTHROID) 112 MCG tablet Take 112 mcg by mouth Daily      lisinopril-hydroCHLOROthiazide (PRINZIDE;ZESTORETIC) 20-25 MG per tablet Take 1 tablet by mouth daily      promethazine (PHENERGAN) 12.5 MG tablet Take 12.5 mg by mouth every 8 hours as needed for Nausea      cyclobenzaprine (FLEXERIL) 5 MG tablet Take 5 mg by mouth as needed for Muscle spasms      naproxen sodium (ANAPROX) 220 MG tablet Take 1 tablet by mouth daily as needed for Pain 60 tablet 3    lisinopril-hydroCHLOROthiazide (PRINZIDE;ZESTORETIC) 20-25 MG per tablet Take 1 tablet by mouth daily 90 tablet 1    levothyroxine (SYNTHROID) 125 MCG tablet Take 1 tablet by mouth every morning (before breakfast) Take 125 mcg by mouth every morning (before breakfast) 90 tablet 1     No current facility-administered medications for this visit. Allergies   Allergen Reactions    Amoxicillin      Other reaction(s): Intolerance    Penicillin G      Other reaction(s):  Intolerance       Family History   Problem Relation Age of Onset    Hypertension Mother     Cancer Mother 48        BREAST    Heart Attack Father 54            Diabetes Father        Social History     Socioeconomic History    Marital status:      Spouse name: Not on file    Number of children: Not on file    Years of education: Not on file    Highest education level: Not on file   Occupational History    Not on file   Tobacco Use    Smoking status: Never    Smokeless tobacco: Never   Substance and Sexual Activity    Alcohol use: Not Currently     Alcohol/week: 7.0 standard drinks     Types: 1 Cans of beer, 6 Shots of liquor per week    Drug use: Never    Sexual activity: Not on file   Other Topics Concern    Not on file   Social History Narrative    Not on file     Social Determinants of Health     Financial Resource Strain: Low Risk     Difficulty of Paying Living Expenses: Not hard at all   Food Insecurity: No Food Insecurity    Worried About Running Out of Food in the Last Year: Never true    Ran Out of Food in the Last Year: Never true   Transportation Needs: Not on file   Physical Activity: Not on file   Stress: Not on file   Social Connections: Not on file   Intimate Partner Violence: Not on file   Housing Stability: Not on file       OCCUPATION: ; 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 Review of Systems   Constitutional:  Negative for activity change, appetite change, fatigue and unexpected weight change. She reports she generally feels stable compared to baseline. HENT: Negative. Respiratory:  Negative for cough and shortness of breath. Recently diagnosed with EMILY. Cardiovascular:  Negative for chest pain and palpitations. Gastrointestinal:  Negative for abdominal pain, blood in stool, constipation, diarrhea, nausea and vomiting. Endocrine: Negative for cold intolerance and heat intolerance. Genitourinary:         Had 2 menstrual cycles and all of 2022 with the last 1 being July. Musculoskeletal:  Positive for arthralgias (Most notable of the right knee). Negative for back pain and myalgias. Allergic/Immunologic: Negative for environmental allergies and food allergies. Neurological: Negative. Negative for dizziness, tremors, seizures, syncope, speech difficulty, weakness, light-headedness, numbness and headaches. Hematological:  Negative for adenopathy. Does not bruise/bleed easily.    Psychiatric/Behavioral:  Negative for agitation and decreased concentration. The patient is not nervous/anxious. Patient denies previous history of DVT/PE. Objective:   Physical Exam  Vitals and nursing note reviewed. Constitutional:       Appearance: Normal appearance. Comments: ECOG 0; pleasant and conversant and in no apparent distress. HENT:      Head: Normocephalic and atraumatic. Eyes:      Extraocular Movements: Extraocular movements intact. Conjunctiva/sclera: Conjunctivae normal.   Cardiovascular:      Rate and Rhythm: Normal rate and regular rhythm. Heart sounds: Normal heart sounds. Pulmonary:      Effort: Pulmonary effort is normal.      Breath sounds: Normal breath sounds. Chest:      Chest wall: No mass. Breasts:     Right: Normal.      Left: Tenderness present. No swelling, bleeding, inverted nipple, mass, nipple discharge or skin change. Comments: Breasts are supple bilaterally. No skin dimpling or puckering. No nipple discharge. Right breast is larger than the left. No clinically suspicious lumps nodules or masses appreciated. No axillary lymphadenopathy. She has scattered, bilateral skin tags of the upper torso. Abdominal:      Palpations: Abdomen is soft. Genitourinary:     Comments: Reports a skin tag of the perineal area as well. Musculoskeletal:         General: Normal range of motion. Cervical back: Normal range of motion and neck supple. Skin:     General: Skin is warm and dry. Neurological:      General: No focal deficit present. Mental Status: She is alert and oriented to person, place, and time. Psychiatric:         Mood and Affect: Mood normal.         Thought Content: Thought content normal.         Judgment: Judgment normal.       Assessment:    Rebecca Patrick is a 46 y.o. extremely pleasant female who is risk for breast cancer include elevated BMI, nulliparity, and mother with breast cancer age 48.   She has a history of a left breast biopsy in 2019 at a breast [facility] in [place] for a reported fibroadenoma. She presents today for evaluation of focal area of left breast discomfort at the 6 o'clock position, 3 cm from the nipple. The discomfort has been present for approximately 1 month but she reports it has since started to subside spontaneously. 12/27/2022 a bilateral diagnostic mammogram and left breast ultrasound at [facility]: Negative, BI-RADS 1. During pt's visit today, a [facility] Risk Evaluation was performed. Pt has a 24.4% lifetime risk (to age 80) of developing breast cancer. Clinically, her breast exam is unremarkable. We reviewed her breast imaging today for educational (not interpretive) purposes on this visit. We discussed breast anatomy, breast density as assigned by radiology, the bi-rads result, and reviewed the purpose of any biopsy or surgical clips (did not verify placement) noted on imaging. In addition, we discussed any changes on imaging as they relate to post procedure/post treatment. It was clearly [facility] that interpretation of imaging and the final result of imaging is at the discretion of the reading radiologist.     We discussed that I cannot guarantee that she does not have breast cancer now; nor can I guarantee that she won't develop breast cancer in the future. However, there were no concerning findings identified on this visit. We reviewed healthy lifestyle, avoiding alcohol, and BSE in detail. We reviewed symptomatic management for breast pain including wearing a good supportive bra, topical and oral analgesics as needed. We reviewed her Jean-Claude Fetch risk score in great detail today. We reviewed NCCN guidelines for breast cancer surveillance in women whose Jean-Claude Fetch score is greater than 20% including, clinical exam every 6 months, annual screening mammogram, and an annual breast MRI.   We discussed that she would not be a candidate for breast MRI due to her elevated BMI however, would recommend bilateral complete breast ultrasounds in 6 months as a secondary way of breast surveillance. We also discussed the importance of routine gynecologic exams. She reports it has been greater than 10 years since her last exam.      After extensive discussion, all questions were answered to her apparent satisfaction. We reviewed breast self-examination in detail and the importance of calling in the event she would notice any changes. We also reviewed recommendations for bilateral complete breast ultrasounds in July and repeat bilateral screening mammogram in December 2023. At this time, she is pleasantly declining any further follow-up. She reports she has \"lived a good life \" and is not interested in being proactive about her health. She is also declining referral to gynecology. She is agreeable to referral to dermatology for numerous benign-appearing skin tags. At this time and despite my best attempts, we will see her on an as-needed basis per her request.  She was encouraged to call us at any time should she change her mind and opt to proceed with enhanced surveillance for her underlying breast cancer risks. Plan:      Continue monthly breast self examination; detailed instructions reviewed today. Bring any changes to your physician's attention. Education/Lifestyle recommendations: A regular exercise program is encouraged. Avoid excessive caffeine intake. Maintain a diet rich in vegetables and fruits avoiding processed and fast food. Avoid alcohol or limit to 3 drinks or less per week. Limit caffeine intake. She will be due for her annual repeat screening mammogram in December 2023. Declined offer to schedule on this visit. Continue follow up with Primary Care.   Office follow-up visit should be scheduled PRN per her request.      I spent a total of 30 minutes on the date of the service which included preparing to see the patient, face-to-face patient care, completing clinical documentation, obtaining and/or reviewing separately obtained history, performing a medically appropriate examination, counseling and educating the patient/family/caregiver, ordering medications, tests, or procedures, communicating with other HCPs (not separately reported), independently interpreting results (not separately reported), communicating results to the patient/family/caregiver and care coordination (not separately reported). This document is generated, in part, by voice recognition software and thus syntax and grammatical errors are possible. Alba Armando, RN, MSN, APRN-CNP, [address]  Advanced Oncology Certified Nurse Practitioner  Department of Breast Surgery  [facility]/  [facility] in collaboration with Dr. Anna Tyler.  Laura/Dr. Vernon Bowser/Dr. Shanthi Beatty APRN-CNP

## 2023-02-20 ENCOUNTER — OFFICE VISIT (OUTPATIENT)
Dept: ORTHOPEDIC SURGERY | Age: 53
End: 2023-02-20
Payer: COMMERCIAL

## 2023-02-20 VITALS — HEIGHT: 63 IN | BODY MASS INDEX: 51.91 KG/M2 | WEIGHT: 293 LBS

## 2023-02-20 DIAGNOSIS — M25.561 RIGHT KNEE PAIN, UNSPECIFIED CHRONICITY: Primary | ICD-10-CM

## 2023-02-20 PROCEDURE — 20610 DRAIN/INJ JOINT/BURSA W/O US: CPT | Performed by: NURSE PRACTITIONER

## 2023-02-20 PROCEDURE — 99203 OFFICE O/P NEW LOW 30 MIN: CPT | Performed by: NURSE PRACTITIONER

## 2023-02-20 RX ORDER — MELOXICAM 15 MG/1
15 TABLET ORAL DAILY PRN
Qty: 30 TABLET | Refills: 0 | Status: SHIPPED | OUTPATIENT
Start: 2023-02-20

## 2023-02-20 RX ORDER — LIDOCAINE HYDROCHLORIDE 10 MG/ML
4 INJECTION, SOLUTION INFILTRATION; PERINEURAL ONCE
Status: COMPLETED | OUTPATIENT
Start: 2023-02-20 | End: 2023-02-20

## 2023-02-20 RX ORDER — TRIAMCINOLONE ACETONIDE 40 MG/ML
40 INJECTION, SUSPENSION INTRA-ARTICULAR; INTRAMUSCULAR ONCE
Status: COMPLETED | OUTPATIENT
Start: 2023-02-20 | End: 2023-02-20

## 2023-02-20 RX ADMIN — TRIAMCINOLONE ACETONIDE 40 MG: 40 INJECTION, SUSPENSION INTRA-ARTICULAR; INTRAMUSCULAR at 15:27

## 2023-02-20 RX ADMIN — LIDOCAINE HYDROCHLORIDE 4 ML: 10 INJECTION, SOLUTION INFILTRATION; PERINEURAL at 15:26

## 2023-02-20 NOTE — PROGRESS NOTES
[facility]   ORTHOPAEDIC SURGERY AND SPORTS MEDICINE  DATE OF VISIT: 02/20/23  New Knee Patient     Referring Provider:   Eleazar Rouse MD  [address] [place] [address],  [address]    CHIEF COMPLAINT:   Chief Complaint   Patient presents with    Knee Pain     Ref - David Gibbs - Chronic pain of right knee - she states that she cant walk or run, fell down steps 7 years ago and injured the knee (has been doing home exercises), c/o swelling in the knee, able to lift the leg, getting shoes / socks is painful, she is using a cane to get around; she states noticed it after painting going up / down a ladder     Results     Brought a disc - [facility]     Injections     She states that she had [facility]one injections at the [facility] - requesting injection         HPI:      Isidro Burden is a 46y.o. year old female who is seen today  for evaluation of Chronic right knee pain. Patient reports initial injury occurred about 7 years ago when she fell down a flight of stairs. She has had intermittent treatments to the right knee which have included intramuscular Toradol injection, intra-articular cortisone injection, physical therapy, home exercises, oral NSAIDs. Patient was seen by Arianna Haynes about 4 months ago when symptoms began to worsen without new injury. She reports steroid injection that was provided intramuscular to her buttocks at the time that helped improve symptoms for some time. She reports pain has been gradually worsening over several weeks. She is employed as a nanny and reports difficulty ambulating stairs and rising when sitting on the floor. Denies mechanical symptoms. Denies feelings of instability.         PAST MEDICAL HISTORY  Past Medical History:   Diagnosis Date    Acquired hypothyroidism 2007    Arthritis 2018    Class 3 severe obesity due to excess calories without serious comorbidity with body mass index (BMI) of 50.0 to 59.9 in adult [facility])     Essential hypertension 2007    H/O breast biopsy 2019       PAST SURGICAL HISTORY  Past Surgical History:   Procedure Laterality Date    INDUCED   1985    WISDOM TOOTH EXTRACTION           FAMILY HISTORY   Family History   Problem Relation Age of Onset    Hypertension Mother     Cancer Mother 48        BREAST    Heart Attack Father 54            Diabetes Father        SOCIAL HISTORY  Social History     Socioeconomic History    Marital status:      Spouse name: Not on file    Number of children: Not on file    Years of education: Not on file    Highest education level: Not on file   Occupational History    Not on file   Tobacco Use    Smoking status: Never    Smokeless tobacco: Never   Substance and Sexual Activity    Alcohol use: Not Currently     Alcohol/week: 7.0 standard drinks     Types: 1 Cans of beer, 6 Shots of liquor per week    Drug use: Never    Sexual activity: Not on file   Other Topics Concern    Not on file   Social History Narrative    Not on file     Social Determinants of Health     Financial Resource Strain: Low Risk     Difficulty of Paying Living Expenses: Not hard at all   Food Insecurity: No Food Insecurity    Worried About Running Out of Food in the Last Year: Never true    Ran Out of Food in the Last Year: Never true   Transportation Needs: Not on file   Physical Activity: Not on file   Stress: Not on file   Social Connections: Not on file   Intimate Partner Violence: Not on file   Housing Stability: Not on file     Social History     Occupational History    Not on file   Tobacco Use    Smoking status: Never    Smokeless tobacco: Never   Substance and Sexual Activity    Alcohol use: Not Currently     Alcohol/week: 7.0 standard drinks     Types: 1 Cans of beer, 6 Shots of liquor per week    Drug use: Never    Sexual activity: Not on file       CURRENT MEDICATIONS     Current Outpatient Medications:     meloxicam (MOBIC) 15 MG tablet, Take 1 tablet by mouth daily as needed for Pain, Disp: 30 tablet, Rfl: 0    lidocaine (LMX) 4 % cream, APPLY TOPICALLY 3 TIMES A DAY AS NEEDED., Disp: , Rfl:     levothyroxine (SYNTHROID) 112 MCG tablet, Take 112 mcg by mouth Daily, Disp: , Rfl:     lisinopril-hydroCHLOROthiazide (PRINZIDE;ZESTORETIC) 20-25 MG per tablet, Take 1 tablet by mouth daily, Disp: , Rfl:     promethazine (PHENERGAN) 12.5 MG tablet, Take 12.5 mg by mouth every 8 hours as needed for Nausea, Disp: , Rfl:     naproxen sodium (ANAPROX) 220 MG tablet, Take 1 tablet by mouth daily as needed for Pain, Disp: 60 tablet, Rfl: 3    cyclobenzaprine (FLEXERIL) 5 MG tablet, Take 5 mg by mouth as needed for Muscle spasms (Patient not taking: Reported on 2/20/2023), Disp: , Rfl:     ALLERGIES  Allergies   Allergen Reactions    Amoxicillin      Other reaction(s): Intolerance    Penicillin G      Other reaction(s): Intolerance       Controlled Substances Monitoring:          REVIEW OF SYSTEMS:     Constitutional:  Negative for weight loss, fevers, chills, fatigue  Cardiovascular: Negative for chest pain, palpitations  Pulmonary: Negative for shortness of breath, labored breathing, cough  GI: negative for abdominal pain, nausea, vomiting   MSK: per HPI  Skin: negative for rash, open wounds    All other systems reviewed and are negative     PHYSICAL EXAM     VITALS: Ht 5' 3\" (1.6 m)   Wt 296 lb (134.3 kg) Comment: per pt  BMI 52.43 kg/m²     General: The patient is alert and oriented x 3, appears to be stated age and in no distress. HEENT: head is normocephalic, atraumatic. EOMI. Neck: supple, trachea midline, no thyromegaly   Cardiovascular: peripheral pulses palpable. Normal Capillary refill   Respiratory: breathing unlabored, chest expansion symmetric   Skin: no rash, no open wounds, no erythema  Psych: normal affect; mood stable  Neurologic: gait normal, sensation grossly intact in extremities  MSK:        Lower Extremity:   Ipsilateral hip exam shows normal range of motion without pain with impingement testing.       Right knee exam range of motion 0-120, positive medial joint line tenderness with palpation. No swelling deformity or palpable effusion present. Stable valgus/varus stress. Patella stable tracks midline with crepitus. Knee is stable on exam.           IMAGING:    XR: 4 views of the right bilateral knee show advanced tricompartmental osteoarthritis. There is near complete joint space loss of the medial compartment with significant osteophyte formation to the posterior knee and patellofemoral joint    Procedure Note: Knee Cortisone injection     The right knee was identified as the injection site. The risk and benefits of a cortisone injection were explained and the patient consented to the injection. Under sterile conditions, the knee was injected with a mixture of 40 mg of Kenalog and 4 mL of 1% Lidocaine without complication. A sterile bandage was applied. Administrations This Visit       lidocaine 1 % injection 4 mL       Admin Date  02/20/2023 Action  Given Dose  4 mL Route  Intra-artICUlar Administered By  Mary Regan ATC              triamcinolone acetonide (KENALOG-40) injection 40 mg       Admin Date  02/20/2023 Action  Given Dose  40 mg Route  Intra-artICUlar Administered By  Mary Regan ATC                      ASSESSMENT  Right knee osteoarthritis    PLAN  Today we discussed her right knee. Patient has been dealing with pain intermittently for several years now. States she has had 1 [place]is[place] that improved knee pain for some time. Seen by [facility] about 4 months ago received a intramuscular injection that provided some good symptom relief. New weightbearing x-rays obtained of her right knee today showing fairly advanced osteoarthritis. We did discuss with her that surgical management would be limited to the arthroplasty at this point. At time of visit today BMI is 52.   Discussed importance of weight reduction with a BMI goal of 45 or below to proceed with surgical management if necessary. Discussed conservative treatment today. Patient was receptive and will begin outpatient physical therapy. She also opted to receive a cortisone injection to the right knee today. She will supplement with oral NSAIDs as needed for symptom relief.   She will follow-up in 3 months for reevaluation        HUMZA Watson-CNP  Orthopedic Surgery   02/20/23  4:56 PM

## 2023-03-08 ENCOUNTER — EVALUATION (OUTPATIENT)
Dept: PHYSICAL THERAPY | Age: 53
End: 2023-03-08
Payer: COMMERCIAL

## 2023-03-08 DIAGNOSIS — M25.561 RIGHT KNEE PAIN, UNSPECIFIED CHRONICITY: Primary | ICD-10-CM

## 2023-03-08 PROCEDURE — 97161 PT EVAL LOW COMPLEX 20 MIN: CPT | Performed by: PHYSICAL THERAPIST

## 2023-03-08 PROCEDURE — 97110 THERAPEUTIC EXERCISES: CPT | Performed by: PHYSICAL THERAPIST

## 2023-03-08 NOTE — PROGRESS NOTES
[facility]          Phone: [phone] Fax: [phone]    Physical Therapy Daily Treatment Note  Date:  3/8/2023    Patient Name:  Chary Mills    :  1970  MRN: [id number]    Evaluating Therapist:  Lori Dunham PT [id number]    Restrictions/Precautions:    Diagnosis:     Diagnosis Orders   1. Right knee pain, unspecified chronicity          Treatment Diagnosis:    Insurance/Certification information:  Dwayne Wang  Referring Physician:  EZIO Canales  Plan of care signed (Y/N):    Visit# / total visits:    Pain level: 4/10   Time In:  1545  Time Out:  1617    Subjective:  See evaluation    Exercises:  Exercise/Equipment Resistance/Repetitions Other comments     stepper       Quad sets       Heel slides       SAQ       SLR       Seated knee flex       Sit to stand       Quad stretch                                                                                       Other Therapeutic Activities:  PT evaluation completed. Pt educated in below exercises for HEP.     Home Exercise Program:  3/8/23 - quad sets, heel slides, SAQ    Manual Treatments:  N/A    Modalities:  US PRN     Time-in Time-out Total Time   79161  Evaluation Low Complexity 1545 1607 22   88820  Evaluation Med Complexity      29569  Evaluation High Complexity      76033  Ther Ex 1607 1617 10   74195  Neuro Re-ed        08350  Ther Activities        31741  Manual Therapy       57063  E-stim       62884  Ultrasound            Session 9632 4080 59       Treatment/Activity Tolerance:  [x] Patient tolerated treatment well [] Patient limited by fatigue  [] Patient limited by pain  [] Patient limited by other medical complications  [] Other:     Prognosis: [x] Good [] Fair  [] Poor    Patient Requires Follow-up: [x] Yes  [] No    Plan:   [] Continue per plan of care [] Alter current plan (see comments)  [x] Plan of care initiated [] Hold pending MD visit [] Discharge  Plan for Next Session: Electronically signed by:  Negin Howard, [address] [facility], [id number]

## 2023-03-08 NOTE — PROGRESS NOTES
[facility]   Phone: [phone]   Fax: [phone]         Date:  3/8/2023   Patient: Socorro Wilde  : 1970  MRN: [id number]  Referring Provider: HUMZA Loyola - CNP  [address]     Medical Diagnosis:      Diagnosis Orders   1. Right knee pain, unspecified chronicity             SUBJECTIVE:     Onset date: 2022. Onset: Insidious onset    Mechanism of Injury: Pt reports that 7.5 years ago she fell and landed on her right knee. Pt has received on/off relief with therapy and injections. Reports recent exacerbation of pain in 2022 following yard work. Previous PT: yes - helped     Medical Management for Current Problem: cortisone injections    Chief complaint: pain, decreased motion, inability / limited ability to use leg, difficulty walking, difficulty with stairs, poor sleep quality     Behavior: condition is getting worse    Pain: constant  Current: 4/10     Best: 2/10     Worst:8/10    Symptom Type/Quality: N/A  Location[de-identified] Knee: medial     Aggravated by: walking, standing, stairs    Relieved by: rest, ice    Imaging results: XR KNEE RIGHT (MIN 4 VIEWS)    Result Date: 2023  EXAMINATION: FOUR XRAY VIEWS OF THE RIGHT KNEE 2023 2:38 pm COMPARISON: None. HISTORY: ORDERING SYSTEM PROVIDED HISTORY: Right knee pain, unspecified chronicity FINDINGS: Four views of the right knee reveal narrowing of the medial compartment with peaking of the intercondylar tibial spine. Osteophyte formation seen of the femoral condyles and tibial plateau. Large osteophyte formation seen of the patella with narrowing of the patellofemoral joint space. There is an osseous fragment identified in the suprapatella bursa. Osseous fragment or calcifications seen within the suprapatella bursa with no significant joint effusion.  Severe degenerative changes seen within the knee with narrowing of the medial compartment and severe narrowing of the patellofemoral joint space with extensive osteophyte formation present. Past Medical History:  Past Medical History:   Diagnosis Date    Acquired hypothyroidism 2007    Arthritis 2018    Class 3 severe obesity due to excess calories without serious comorbidity with body mass index (BMI) of 50.0 to 59.9 in adult [facility])     Essential hypertension 2007    H/O breast biopsy 2019     Past Surgical History:   Procedure Laterality Date    INDUCED       WISDOM TOOTH EXTRACTION         Medications:   Current Outpatient Medications   Medication Sig Dispense Refill    meloxicam (MOBIC) 15 MG tablet Take 1 tablet by mouth daily as needed for Pain 30 tablet 0    lidocaine (LMX) 4 % cream APPLY TOPICALLY 3 TIMES A DAY AS NEEDED. levothyroxine (SYNTHROID) 112 MCG tablet Take 112 mcg by mouth Daily      lisinopril-hydroCHLOROthiazide (PRINZIDE;ZESTORETIC) 20-25 MG per tablet Take 1 tablet by mouth daily      promethazine (PHENERGAN) 12.5 MG tablet Take 12.5 mg by mouth every 8 hours as needed for Nausea      cyclobenzaprine (FLEXERIL) 5 MG tablet Take 5 mg by mouth as needed for Muscle spasms (Patient not taking: Reported on 2023)      naproxen sodium (ANAPROX) 220 MG tablet Take 1 tablet by mouth daily as needed for Pain 60 tablet 3     No current facility-administered medications for this visit. Occupation:  nanny . Physical demands include: lifting, carrying, pushing, pulling medium weighted items (20 - 50 lbs Occasionally), walking, standing, sitting. Status: full time.     Exercise regimen: none    Hobbies: decorate for parties, clean attics for people    Patient Goals: pain relief, be able to do work and daily activities    Precautions/Contraindications: none    OBJECTIVE:     Observations: Class 3 obesity (BMI of 40 or higher)    Inspection:  calcaneal pronation, forefoot supination    Edema: mild, medial    Gait: antalgic gait, limp R LE    Joint/Motion:    Knee:  Right: AROM: 93° Flexion,  0° Extension    Left:   AROM: 120° Flexion,  0° Extension      Strength:    Knee:   Right: Hip: 4/5, Knee: Flexion 4/5,  Extension 4/5  Left: Hip: 5/5, Knee: Flexion 5/5,  Extension 5/5    Palpation: Tender to palpation at medial knee     Special Tests/Functional Screens:    [] Lachman's []+ / [] -    [x] Anterior Drawer []+ / [x] -   [] Valgus Stress []+ / [] -  [] Thessaly Test []+ / [] -   [] Terri's Sign []+ / [] -   [] Apley compression: []+ / [] - [] Bounce Home []+ / [] -   [] Eliana []+ / [] -   [] Pivot Shift []+ / [] -   [x] Posterior Drawer []+ / [x] -   [] Varus Stress []+ / [] -   [] Patellar Tracking: []+ / [] -     Special test comments: N/A      ASSESSMENT     Outcome Measure:   Lower Extremity Functional Scale (LEFS) 30/80    Problems:   Pain reported 2-8/10  ROM decreased   Strength decreased   Decreased functional ability with sitting tolerance, standing tolerance , walking, stairs, limited tolerance to weightbearing tasks and weightbearing duration, work    Reason for Skilled Care: Pt with worsening right knee pain due to arthritis which is limiting pt work and functional activities and decreasing pt quality of life. [x] There are no barriers affecting plan of care or recovery    [] Barriers to this patient's plan of care or recovery include.     Domestic Concerns:  [x] No  [] Yes:    Long Term goals (4-6 weeks)  Decrease reported pain to 0-4/10  Increase ROM to 105° right knee flex  Increase Strength to 4+ to 5/5   Able to perform/complete the following functions/tasks: Pt will demonstrate a non-antalgic gait pattern  LEFS 45/80  Independent with Home Exercise Programs    Rehab Potential: [x] Good  [] Fair  [] Poor    PLAN       Treatment Plan:   [x] Therapeutic Exercise  [x] Therapeutic Activity  [x] Neuromuscular Re-education   [] Gait Training  [] Balance Training  [] Aerobic conditioning  [x] Manual Therapy  [] Massage/Fascial release   [] Work/Sport specific activities    [] Pain Neuroscience [x] Cold/hotpack  [] Vasocompression  [x] Electrical Stimulation  [] Lumbar/Cervical Traction  [x] Ultrasound   [] Iontophoresis: 4 mg/mL Dexamethasone Sodium Phosphate 40-80 mAmin  [] Dry Needling      [x] Instruction in HEP      []  Medication allergies reviewed for use of Dexamethasone Sodium Phosphate 4mg/ml  with iontophoresis treatments. Patient is not allergic. The following CPT codes are likely to be used in the care of this patient: 83805 PT Evaluation: Low Complexity, 33203 PT Re-Evaluation, 14108 Therapeutic Exercise, 31529 Neuromuscular Re-Education, 09892 Therapeutic Activities, 10731 Manual Therapy, 93749 Electric Stimulation, and 11419 Ultrasound      Suggested Professional Referral: [x] No  [] Yes:     Patient Education:  [x] Plans/Goals, Risks/Benefits discussed  [x] Home exercise program  Method of Education: [x] Verbal  [x] Demo  [x] Written  Comprehension of Education:  [x] Verbalizes understanding. [x] Demonstrates understanding. [] Needs Review. [] Demonstrates/verbalizes understanding of HEP/Ed previously given. Frequency: 1-2 days per week for 4-6 weeks    Patient understands diagnosis/prognosis and consents to treatment, plan and goals: [x] Yes    [] No     Thank you for the opportunity to work with your patient. If you have questions or comments, please contact me at numbers listed above. Electronically signed by: Erika Sung, [address] [place] [address], [id number]    Medicare Patients Only     Please sign Physician's Certification and return to: [address]  [facility]  [address] [place] [address] [id number]  Dept: [phone]  Dept Fax: [phone]  Loc: [phone] Certification / Comments     Frequency/Duration 1-2 days per week for 4-6 weeks. Certification period from 3/8/2023  to 6/7/23.     I have reviewed the Plan of Care established for skilled therapy services and certify that the services are required and that they will be provided while the patient is under my care.     Physician's Comments/Revisions:               Physician's Printed Name:                                           [de-identified] Signature:                                                               Date:

## 2023-03-17 ENCOUNTER — TREATMENT (OUTPATIENT)
Dept: PHYSICAL THERAPY | Age: 53
End: 2023-03-17

## 2023-03-17 DIAGNOSIS — M25.561 RIGHT KNEE PAIN, UNSPECIFIED CHRONICITY: Primary | ICD-10-CM

## 2023-03-17 NOTE — PROGRESS NOTES
[facility]          Phone: [phone] Fax: [phone]    Physical Therapy Daily Treatment Note  Date:  3/17/2023    Patient Name:  Fernando Hopkins    :  1970  MRN: [id number]    Evaluating Therapist:  Tim Andino, PT [id number]    Restrictions/Precautions:    Diagnosis:     Diagnosis Orders   1. Right knee pain, unspecified chronicity          Treatment Diagnosis:    Insurance/Certification information:  [place]  Referring Physician:  EZIO Watson  Plan of care signed (Y/N):    Visit# / total visits:    Pain level: 4/10   Time In:  1553  Time Out:  1633    Subjective:  Pt reports knee if feeling good and that she had a good week.     Exercises:  Exercise/Equipment Resistance/Repetitions Other comments     stepper 10 minutes      Quad sets 5 sec x 20 reps      Heel slides 3 sec x 20 reps      SAQ 3 sec x 20 reps      SLR 3 sec 2 x 10 reps      Seated knee flex RTB 2 x 10 reps      Sit to stand From mat x 10 reps      Quad stretch       Standing hip 3-way X10 reps B LE        Step-ups 6\" step x 10 reps       Treadmill 1.9 MPH x 10 minutes                                                              Other Therapeutic Activities:      Home Exercise Program:  3/8/23 - quad sets, heel slides, SAQ    Manual Treatments:  N/A    Modalities:  US PRN     Time-in Time-out Total Time   31856  Evaluation Low Complexity      81818  Evaluation Med Complexity      79457  Evaluation High Complexity      63677  Ther Ex 1553 1633 40   02109  Neuro Re-ed        04959  Ther Activities        39716  Manual Therapy       84822  E-stim       28677  Ultrasound            Session 0255 7940 40       Treatment/Activity Tolerance:  [x] Patient tolerated treatment well [] Patient limited by fatigue  [] Patient limited by pain  [] Patient limited by other medical complications  [] Other:     Prognosis: [x] Good [] Fair  [] Poor    Patient Requires Follow-up: [x] Yes  [] No    Plan:   [x] Continue per plan of care [] Alter current plan (see comments)  [] Plan of care initiated [] Hold pending MD visit [] Discharge  Plan for Next Session:        Electronically signed by:  Randy Alvarado, [id number]

## 2023-03-23 ENCOUNTER — TREATMENT (OUTPATIENT)
Dept: PHYSICAL THERAPY | Age: 53
End: 2023-03-23

## 2023-03-23 DIAGNOSIS — M25.561 RIGHT KNEE PAIN, UNSPECIFIED CHRONICITY: Primary | ICD-10-CM

## 2023-03-23 NOTE — PROGRESS NOTES
[facility]          Phone: [phone] Fax: [phone]    Physical Therapy Daily Treatment Note  Date:  3/23/2023    Patient Name:  Sharron Jacques    :  1970  MRN: [id number]    Evaluating Therapist:  Dwaine Adamson PT [id number]    Restrictions/Precautions:    Diagnosis:     Diagnosis Orders   1. Right knee pain, unspecified chronicity          Treatment Diagnosis:    Insurance/Certification information:  [employer] Raw  Referring Physician:  EZIO Macias  Plan of care signed (Y/N):    Visit# / total visits:  3/8  Pain level: 1/10   Time In:  1540  Time Out:  1608    Subjective:  Pt reports knee has been feeling good since she received the injection.     Exercises:  Exercise/Equipment Resistance/Repetitions Other comments     stepper 10 minutes      Quad sets 5 sec x 20 reps      Heel slides 3 sec x 30 reps      SAQ 3 sec x 20 reps      SLR 3 sec 2 x 10 reps      Seated knee flex RTB 2 x 10 reps      Sit to stand From mat x 15 reps      Quad stretch       Standing hip 3-way X10 reps B LE        Step-ups 6\" step x 10 reps                                                                 Other Therapeutic Activities:      Home Exercise Program:  3/8/23 - quad sets, heel slides, SAQ    Manual Treatments:  N/A    Modalities:  US PRN     Time-in Time-out Total Time   30936  Evaluation Low Complexity      49117  Evaluation Med Complexity      60886  Evaluation High Complexity      97459  Ther Ex 1540 1608 28   37484  Neuro Re-ed        14086  Ther Activities        61185  Manual Therapy       91880  E-stim       11684  Ultrasound            Session 5021 5952 28       Treatment/Activity Tolerance:  [x] Patient tolerated treatment well [] Patient limited by fatigue  [] Patient limited by pain  [] Patient limited by other medical complications  [] Other:     Prognosis: [x] Good [] Fair  [] Poor    Patient Requires Follow-up: [x] Yes  [] No    Plan:   [x] Continue

## 2023-03-27 DIAGNOSIS — G47.33 OBSTRUCTIVE SLEEP APNEA SYNDROME: ICD-10-CM

## 2023-03-27 DIAGNOSIS — E78.2 MIXED HYPERLIPIDEMIA: ICD-10-CM

## 2023-03-27 DIAGNOSIS — E03.9 ACQUIRED HYPOTHYROIDISM: ICD-10-CM

## 2023-03-27 LAB
ALBUMIN SERPL-MCNC: 4.3 G/DL (ref 3.5–5.2)
ALP SERPL-CCNC: 54 U/L (ref 35–104)
ALT SERPL-CCNC: 29 U/L (ref 0–32)
ANION GAP SERPL CALCULATED.3IONS-SCNC: 10 MMOL/L (ref 7–16)
AST SERPL-CCNC: 25 U/L (ref 0–31)
BASOPHILS # BLD: 0.06 E9/L (ref 0–0.2)
BASOPHILS NFR BLD: 1 % (ref 0–2)
BILIRUB SERPL-MCNC: 0.4 MG/DL (ref 0–1.2)
BUN SERPL-MCNC: 13 MG/DL (ref 6–20)
CALCIUM SERPL-MCNC: 9.7 MG/DL (ref 8.6–10.2)
CHLORIDE SERPL-SCNC: 103 MMOL/L (ref 98–107)
CO2 SERPL-SCNC: 29 MMOL/L (ref 22–29)
CREAT SERPL-MCNC: 0.7 MG/DL (ref 0.5–1)
EOSINOPHIL # BLD: 0.13 E9/L (ref 0.05–0.5)
EOSINOPHIL NFR BLD: 2.2 % (ref 0–6)
ERYTHROCYTE [DISTWIDTH] IN BLOOD BY AUTOMATED COUNT: 13.9 FL (ref 11.5–15)
GLUCOSE SERPL-MCNC: 85 MG/DL (ref 74–99)
HCT VFR BLD AUTO: 46.7 % (ref 34–48)
HGB BLD-MCNC: 14.2 G/DL (ref 11.5–15.5)
IMM GRANULOCYTES # BLD: 0.05 E9/L
IMM GRANULOCYTES NFR BLD: 0.9 % (ref 0–5)
LYMPHOCYTES # BLD: 1.99 E9/L (ref 1.5–4)
LYMPHOCYTES NFR BLD: 34.4 % (ref 20–42)
MCH RBC QN AUTO: 30.3 PG (ref 26–35)
MCHC RBC AUTO-ENTMCNC: 30.4 % (ref 32–34.5)
MCV RBC AUTO: 99.6 FL (ref 80–99.9)
MONOCYTES # BLD: 0.39 E9/L (ref 0.1–0.95)
MONOCYTES NFR BLD: 6.7 % (ref 2–12)
NEUTROPHILS # BLD: 3.17 E9/L (ref 1.8–7.3)
NEUTS SEG NFR BLD: 54.8 % (ref 43–80)
PLATELET # BLD AUTO: 200 E9/L (ref 130–450)
PMV BLD AUTO: 12.8 FL (ref 7–12)
POTASSIUM SERPL-SCNC: 4.5 MMOL/L (ref 3.5–5)
PROT SERPL-MCNC: 7 G/DL (ref 6.4–8.3)
RBC # BLD AUTO: 4.69 E12/L (ref 3.5–5.5)
SODIUM SERPL-SCNC: 142 MMOL/L (ref 132–146)
TSH SERPL-MCNC: 4.27 UIU/ML (ref 0.27–4.2)
WBC # BLD: 5.8 E9/L (ref 4.5–11.5)

## 2023-03-30 ENCOUNTER — TREATMENT (OUTPATIENT)
Dept: PHYSICAL THERAPY | Age: 53
End: 2023-03-30

## 2023-04-24 ENCOUNTER — HOSPITAL ENCOUNTER (OUTPATIENT)
Dept: ULTRASOUND IMAGING | Age: 53
Discharge: HOME | End: 2023-04-26
Payer: COMMERCIAL

## 2023-04-24 DIAGNOSIS — E03.9 HYPOTHYROIDISM, UNSPECIFIED TYPE: ICD-10-CM

## 2023-04-24 PROCEDURE — 76536 US EXAM OF HEAD AND NECK: CPT

## 2023-05-04 ENCOUNTER — TELEPHONE (OUTPATIENT)
Dept: PRIMARY CARE CLINIC | Age: 53
End: 2023-05-04

## 2023-05-04 ENCOUNTER — OFFICE VISIT (OUTPATIENT)
Dept: PRIMARY CARE CLINIC | Age: 53
End: 2023-05-04
Payer: COMMERCIAL

## 2023-05-04 VITALS
DIASTOLIC BLOOD PRESSURE: 62 MMHG | TEMPERATURE: 97.4 F | BODY MASS INDEX: 53.5 KG/M2 | OXYGEN SATURATION: 98 % | HEART RATE: 66 BPM | SYSTOLIC BLOOD PRESSURE: 102 MMHG | WEIGHT: 293 LBS

## 2023-05-04 DIAGNOSIS — E03.9 ACQUIRED HYPOTHYROIDISM: Primary | ICD-10-CM

## 2023-05-04 DIAGNOSIS — E04.1 THYROID NODULE: ICD-10-CM

## 2023-05-04 DIAGNOSIS — I10 ESSENTIAL HYPERTENSION: ICD-10-CM

## 2023-05-04 DIAGNOSIS — E78.2 MIXED HYPERLIPIDEMIA: ICD-10-CM

## 2023-05-04 PROCEDURE — 3074F SYST BP LT 130 MM HG: CPT | Performed by: INTERNAL MEDICINE

## 2023-05-04 PROCEDURE — 99214 OFFICE O/P EST MOD 30 MIN: CPT | Performed by: INTERNAL MEDICINE

## 2023-05-04 PROCEDURE — 3078F DIAST BP <80 MM HG: CPT | Performed by: INTERNAL MEDICINE

## 2023-05-04 RX ORDER — PROMETHAZINE HYDROCHLORIDE 12.5 MG/1
12.5 TABLET ORAL EVERY 8 HOURS PRN
Qty: 30 TABLET | Refills: 2 | Status: SHIPPED | OUTPATIENT
Start: 2023-05-04

## 2023-05-04 RX ORDER — CYCLOBENZAPRINE HCL 5 MG
5 TABLET ORAL PRN
Qty: 30 TABLET | Refills: 2 | Status: SHIPPED | OUTPATIENT
Start: 2023-05-04

## 2023-05-04 RX ORDER — LEVOTHYROXINE SODIUM 0.12 MG/1
125 TABLET ORAL DAILY
Qty: 90 TABLET | Refills: 1 | Status: SHIPPED | OUTPATIENT
Start: 2023-05-04

## 2023-05-04 SDOH — ECONOMIC STABILITY: FOOD INSECURITY: WITHIN THE PAST 12 MONTHS, YOU WORRIED THAT YOUR FOOD WOULD RUN OUT BEFORE YOU GOT MONEY TO BUY MORE.: NEVER TRUE

## 2023-05-04 SDOH — ECONOMIC STABILITY: INCOME INSECURITY: HOW HARD IS IT FOR YOU TO PAY FOR THE VERY BASICS LIKE FOOD, HOUSING, MEDICAL CARE, AND HEATING?: NOT HARD AT ALL

## 2023-05-04 SDOH — ECONOMIC STABILITY: FOOD INSECURITY: WITHIN THE PAST 12 MONTHS, THE FOOD YOU BOUGHT JUST DIDN'T LAST AND YOU DIDN'T HAVE MONEY TO GET MORE.: NEVER TRUE

## 2023-05-04 ASSESSMENT — ENCOUNTER SYMPTOMS
EYES NEGATIVE: 1
SHORTNESS OF BREATH: 0
WHEEZING: 0
APNEA: 1
GASTROINTESTINAL NEGATIVE: 1
ALLERGIC/IMMUNOLOGIC NEGATIVE: 1

## 2023-05-04 ASSESSMENT — PATIENT HEALTH QUESTIONNAIRE - PHQ9
SUM OF ALL RESPONSES TO PHQ QUESTIONS 1-9: 0
1. LITTLE INTEREST OR PLEASURE IN DOING THINGS: 0
SUM OF ALL RESPONSES TO PHQ QUESTIONS 1-9: 0
SUM OF ALL RESPONSES TO PHQ9 QUESTIONS 1 & 2: 0
SUM OF ALL RESPONSES TO PHQ QUESTIONS 1-9: 0
SUM OF ALL RESPONSES TO PHQ QUESTIONS 1-9: 0
2. FEELING DOWN, DEPRESSED OR HOPELESS: 0

## 2023-05-04 NOTE — PROGRESS NOTES
2023    Joan Culp (:  1970) is a 46 y.o. female, here for evaluation of the following medical concerns:    Patient did have steroid injection into her knee by orthopedics. It did help quite a bit especially after the first few days. She is able to move much better and has much less pain. Patient struggled with obesity for many years. We discussed possible alternatives. We will trial of Wegovy and see if this might be helpful. I believe this will be quite an improvement compared to previous medications. Patient also had her breast tenderness evaluated at the [facility]. She is very impressed. Her recent labs are reviewed. The patient does have an elevated TSH and I will adjust her thyroid medications. Hypertension  Associated symptoms include anxiety. Pertinent negatives include no shortness of breath. Anxiety  Patient reports no shortness of breath. Review of Systems   Constitutional: Negative. HENT: Negative. Eyes: Negative. Respiratory:  Positive for apnea. Negative for shortness of breath and wheezing. Cardiovascular: Negative. Gastrointestinal: Negative. Endocrine:        Hypothyroidism with elevated TSH. Genitourinary: Negative. Musculoskeletal:         Chronic arthritis of the right knee. Skin: Negative. Allergic/Immunologic: Negative. Neurological: Negative. Hematological: Negative. Psychiatric/Behavioral: Negative. Prior to Visit Medications    Medication Sig Taking? Authorizing Provider   meloxicam (MOBIC) 15 MG tablet Take 1 tablet by mouth daily as needed for Pain Yes HUMZA Magana - CNP   lidocaine (LMX) 4 % cream APPLY TOPICALLY 3 TIMES A DAY AS NEEDED.  Yes Historical Provider, MD   levothyroxine (SYNTHROID) 112 MCG tablet Take 1 tablet by mouth Daily Yes Historical Provider, MD   lisinopril-hydroCHLOROthiazide (PRINZIDE;ZESTORETIC) 20-25 MG per tablet Take 1 tablet by mouth daily Yes Historical Provider, MD

## 2023-05-12 NOTE — PROGRESS NOTES
Date:  5/12/2023          Dear Xavi Nolasco, HUMZA-CNP:       This is to inform you that, as per [address], your patient Tita Gruber is as of todays date being discharged from Physical Therapy secondary to the following reasons:    Per patient request. Patient cancelled last scheduled visit and did not wish to schedule any additional treatments. If you have any questions, please feel free to call at [phone]      Thank you          Yamel Rodriguez, [address] [place] [address], [phone]    The information contained in this Fax the designated recipients intend message only for the personal and confidential use named above. This information is to be handled as privileged and confidential.  If the reader of this message is not the intended recipient, you are hereby notified that you have received this document in error and that any review, dissemination, distribution or copying of this message is strictly prohibited. If you receive this communication in error, please notify us immediately at the above number and return the original to us by mail.   Thank you      [facility]   Lidya Martinez, [address] [address]

## 2023-05-25 ENCOUNTER — TELEPHONE (OUTPATIENT)
Dept: PRIMARY CARE CLINIC | Age: 53
End: 2023-05-25

## 2023-05-25 ENCOUNTER — OFFICE VISIT (OUTPATIENT)
Dept: PRIMARY CARE CLINIC | Age: 53
End: 2023-05-25

## 2023-05-25 VITALS
SYSTOLIC BLOOD PRESSURE: 100 MMHG | TEMPERATURE: 97.4 F | HEART RATE: 78 BPM | BODY MASS INDEX: 52.97 KG/M2 | DIASTOLIC BLOOD PRESSURE: 62 MMHG | WEIGHT: 293 LBS

## 2023-05-25 DIAGNOSIS — M25.561 CHRONIC PAIN OF RIGHT KNEE: Primary | ICD-10-CM

## 2023-05-25 DIAGNOSIS — G89.29 CHRONIC PAIN OF RIGHT KNEE: Primary | ICD-10-CM

## 2023-05-25 RX ORDER — LISINOPRIL AND HYDROCHLOROTHIAZIDE 25; 20 MG/1; MG/1
TABLET ORAL
Qty: 90 TABLET | Refills: 0 | Status: SHIPPED | OUTPATIENT
Start: 2023-05-25

## 2023-05-25 RX ORDER — TRIAMCINOLONE ACETONIDE 40 MG/ML
40 INJECTION, SUSPENSION INTRA-ARTICULAR; INTRAMUSCULAR ONCE
Status: COMPLETED | OUTPATIENT
Start: 2023-05-25 | End: 2023-05-25

## 2023-05-25 RX ADMIN — TRIAMCINOLONE ACETONIDE 40 MG: 40 INJECTION, SUSPENSION INTRA-ARTICULAR; INTRAMUSCULAR at 16:16

## 2023-05-25 ASSESSMENT — ENCOUNTER SYMPTOMS
APNEA: 1
SHORTNESS OF BREATH: 0
WHEEZING: 0
GASTROINTESTINAL NEGATIVE: 1
ALLERGIC/IMMUNOLOGIC NEGATIVE: 1
EYES NEGATIVE: 1

## 2023-05-25 NOTE — TELEPHONE ENCOUNTER
Pt calling, she is in extreme knee pain. Pt states you said for her just to call and you would be able to fit her in for a knee injection. Please advise where I would be able to fit her in at, she would prefer today if possible.

## 2023-05-25 NOTE — PROGRESS NOTES
2023    Kamari Jaramillo (:  1970) is a 46 y.o. female, here for evaluation of the following medical concerns:    Patient had an appointment with Dr. Trang Finnegan earlier in the week but she canceled it because she was feeling good and did not have pain. Patient had worsening pain over this last several days. Patient does have severe osteoarthritic changes of the right knee including some osteophytes. She has noticed some slight swelling there. I did review the x-ray prior to her injection. Hypertension  Associated symptoms include anxiety. Pertinent negatives include no shortness of breath. Anxiety  Patient reports no shortness of breath. Knee Pain         Review of Systems   Constitutional: Negative. HENT: Negative. Eyes: Negative. Respiratory:  Positive for apnea. Negative for shortness of breath and wheezing. Cardiovascular: Negative. Gastrointestinal: Negative. Endocrine:        Hypothyroidism with elevated TSH. Genitourinary: Negative. Musculoskeletal:         Chronic arthritis of the right knee. Skin: Negative. Allergic/Immunologic: Negative. Neurological: Negative. Hematological: Negative. Psychiatric/Behavioral: Negative. Prior to Visit Medications    Medication Sig Taking? Authorizing Provider   promethazine (PHENERGAN) 12.5 MG tablet Take 1 tablet by mouth every 8 hours as needed for Nausea Yes Tomas Rivera MD   cyclobenzaprine (FLEXERIL) 5 MG tablet Take 1 tablet by mouth as needed for Muscle spasms Yes Tomas Rivera MD   Semaglutide-Weight Management (WEGOVY) 0.25 MG/0.5ML SOAJ SC injection Inject 0.25 mg into the skin every 7 days Yes Tomas Rivera MD   levothyroxine (SYNTHROID) 125 MCG tablet Take 1 tablet by mouth daily Yes Tomas Rivera MD   meloxicam (MOBIC) 15 MG tablet Take 1 tablet by mouth daily as needed for Pain Yes HUMZA Lima - CNP   lidocaine (LMX) 4 % cream APPLY TOPICALLY 3 TIMES A DAY AS NEEDED.  Yes

## 2023-05-25 NOTE — TELEPHONE ENCOUNTER
Last Appointment:  5/4/2023  Future Appointments   Date Time Provider Cassi Blanquita   5/25/2023  3:45 PM Leonel Goddard MD [facility] [place]   7/24/2023  3:15 PM Leonel Goddard MD [facility] [place]   11/13/2023  3:30 PM Leonel Goddard MD [facility]

## 2023-05-25 NOTE — TELEPHONE ENCOUNTER
Patient called back and wanted you to know she was supposed to have an appt on 5/22 with dr Randall Catherine but she cancelled because her knee had  felt great.

## 2023-07-31 ENCOUNTER — OFFICE VISIT (OUTPATIENT)
Dept: PRIMARY CARE CLINIC | Age: 53
End: 2023-07-31
Payer: COMMERCIAL

## 2023-07-31 VITALS
OXYGEN SATURATION: 98 % | DIASTOLIC BLOOD PRESSURE: 62 MMHG | TEMPERATURE: 97.8 F | HEART RATE: 64 BPM | SYSTOLIC BLOOD PRESSURE: 102 MMHG | WEIGHT: 293 LBS | BODY MASS INDEX: 52.61 KG/M2

## 2023-07-31 DIAGNOSIS — G47.33 OBSTRUCTIVE SLEEP APNEA SYNDROME: ICD-10-CM

## 2023-07-31 DIAGNOSIS — E66.01 CLASS 3 SEVERE OBESITY DUE TO EXCESS CALORIES WITHOUT SERIOUS COMORBIDITY WITH BODY MASS INDEX (BMI) OF 50.0 TO 59.9 IN ADULT (HCC): ICD-10-CM

## 2023-07-31 DIAGNOSIS — N64.4 BREAST PAIN, LEFT: ICD-10-CM

## 2023-07-31 DIAGNOSIS — E78.2 MIXED HYPERLIPIDEMIA: ICD-10-CM

## 2023-07-31 DIAGNOSIS — E03.9 ACQUIRED HYPOTHYROIDISM: ICD-10-CM

## 2023-07-31 DIAGNOSIS — I10 ESSENTIAL HYPERTENSION: Primary | ICD-10-CM

## 2023-07-31 DIAGNOSIS — I10 ESSENTIAL HYPERTENSION: ICD-10-CM

## 2023-07-31 LAB — TSH SERPL DL<=0.05 MIU/L-ACNC: 2.17 UIU/ML (ref 0.27–4.2)

## 2023-07-31 PROCEDURE — 3074F SYST BP LT 130 MM HG: CPT | Performed by: INTERNAL MEDICINE

## 2023-07-31 PROCEDURE — 3078F DIAST BP <80 MM HG: CPT | Performed by: INTERNAL MEDICINE

## 2023-07-31 PROCEDURE — 99214 OFFICE O/P EST MOD 30 MIN: CPT | Performed by: INTERNAL MEDICINE

## 2023-07-31 RX ORDER — LISINOPRIL AND HYDROCHLOROTHIAZIDE 25; 20 MG/1; MG/1
1 TABLET ORAL DAILY
Qty: 90 TABLET | Refills: 1 | Status: SHIPPED | OUTPATIENT
Start: 2023-07-31

## 2023-07-31 RX ORDER — NALTREXONE HYDROCHLORIDE AND BUPROPION HYDROCHLORIDE 8; 90 MG/1; MG/1
1 TABLET, EXTENDED RELEASE ORAL 2 TIMES DAILY
Qty: 60 TABLET | Refills: 2 | Status: SHIPPED | OUTPATIENT
Start: 2023-07-31

## 2023-07-31 ASSESSMENT — ENCOUNTER SYMPTOMS
EYES NEGATIVE: 1
ALLERGIC/IMMUNOLOGIC NEGATIVE: 1
GASTROINTESTINAL NEGATIVE: 1
SHORTNESS OF BREATH: 0
WHEEZING: 0
APNEA: 1

## 2023-07-31 NOTE — PROGRESS NOTES
2023    Charito Gayle (:  1970) is a 46 y.o. female, here for evaluation of the following medical concerns:    Patient's insurance will not pay for weight loss medication. She was unable to get [facility]Y or semaglutide. Patient is struggled forever in terms of trying to lose weight. Her insurance company will not pay for knee replacement unless she is down to 45 in terms of a BMI. This would need to be about 40 pounds. We did discuss medication use other that perhaps she could afford. It appears that Contrave is probably the most reasonable in terms of cost and I will start that medication to see how she does. We did alter her thyroid medication and we will recheck dosing today. She does have a history of hypertension which today is well controlled. Hypertension  Associated symptoms include anxiety. Pertinent negatives include no shortness of breath. Anxiety  Patient reports no shortness of breath. Knee Pain     Weight Management        Review of Systems   Constitutional: Negative. HENT: Negative. Eyes: Negative. Respiratory:  Positive for apnea. Negative for shortness of breath and wheezing. Cardiovascular: Negative. Gastrointestinal: Negative. Endocrine:        Hypothyroidism with elevated TSH. Genitourinary: Negative. Musculoskeletal:         Chronic arthritis of the right knee. Skin: Negative. Allergic/Immunologic: Negative. Neurological: Negative. Hematological: Negative. Psychiatric/Behavioral: Negative. Prior to Visit Medications    Medication Sig Taking?  Authorizing Provider   lisinopril-hydroCHLOROthiazide (PRINZIDE;ZESTORETIC) 20-25 MG per tablet Take 1 tablet by mouth daily Yes Johnny Wilson MD   naltrexone-buPROPion (CONTRAVE) 8-90 MG per extended release tablet Take 1 tablet by mouth 2 times daily Yes Johnny Wilson MD   promethazine (PHENERGAN) 12.5 MG tablet Take 1 tablet by mouth every 8 hours as needed for Nausea Yes

## 2023-09-26 ENCOUNTER — OFFICE VISIT (OUTPATIENT)
Dept: FAMILY MEDICINE CLINIC | Age: 53
End: 2023-09-26
Payer: COMMERCIAL

## 2023-09-26 VITALS
SYSTOLIC BLOOD PRESSURE: 134 MMHG | BODY MASS INDEX: 51.73 KG/M2 | HEART RATE: 65 BPM | OXYGEN SATURATION: 97 % | TEMPERATURE: 96.9 F | DIASTOLIC BLOOD PRESSURE: 60 MMHG | WEIGHT: 292 LBS

## 2023-09-26 DIAGNOSIS — E66.01 CLASS 3 SEVERE OBESITY DUE TO EXCESS CALORIES WITHOUT SERIOUS COMORBIDITY WITH BODY MASS INDEX (BMI) OF 50.0 TO 59.9 IN ADULT (HCC): ICD-10-CM

## 2023-09-26 DIAGNOSIS — G89.29 CHRONIC PAIN OF RIGHT KNEE: Primary | ICD-10-CM

## 2023-09-26 DIAGNOSIS — M25.561 CHRONIC PAIN OF RIGHT KNEE: Primary | ICD-10-CM

## 2023-09-26 PROCEDURE — 3078F DIAST BP <80 MM HG: CPT | Performed by: INTERNAL MEDICINE

## 2023-09-26 PROCEDURE — 3075F SYST BP GE 130 - 139MM HG: CPT | Performed by: INTERNAL MEDICINE

## 2023-09-26 PROCEDURE — 99213 OFFICE O/P EST LOW 20 MIN: CPT | Performed by: INTERNAL MEDICINE

## 2023-09-26 PROCEDURE — 20610 DRAIN/INJ JOINT/BURSA W/O US: CPT | Performed by: INTERNAL MEDICINE

## 2023-09-26 RX ORDER — TRIAMCINOLONE ACETONIDE 40 MG/ML
40 INJECTION, SUSPENSION INTRA-ARTICULAR; INTRAMUSCULAR ONCE
Status: COMPLETED | OUTPATIENT
Start: 2023-09-26 | End: 2023-09-26

## 2023-09-26 RX ADMIN — TRIAMCINOLONE ACETONIDE 40 MG: 40 INJECTION, SUSPENSION INTRA-ARTICULAR; INTRAMUSCULAR at 17:28

## 2023-09-26 ASSESSMENT — ENCOUNTER SYMPTOMS
WHEEZING: 0
ALLERGIC/IMMUNOLOGIC NEGATIVE: 1
SHORTNESS OF BREATH: 0
APNEA: 1
EYES NEGATIVE: 1
GASTROINTESTINAL NEGATIVE: 1

## 2023-09-26 NOTE — PROGRESS NOTES
2023    Mariah Davies (:  1970) is a 48 y.o. female, here for evaluation of the following medical concerns:    Patient was unable to tolerate Contrave. She did very well with physical therapy for knee pain until just recently when she was working for 4 days and had worsening knee discomfort. She does have chronic osteoarthritis of both knees. We did discuss possibly [address] as a source of medication for [place]. Hypertension  Associated symptoms include anxiety. Pertinent negatives include no shortness of breath. Anxiety  Patient reports no shortness of breath. Knee Pain     Weight Management          Review of Systems   Constitutional: Negative. HENT: Negative. Eyes: Negative. Respiratory:  Positive for apnea. Negative for shortness of breath and wheezing. Cardiovascular: Negative. Gastrointestinal: Negative. Endocrine:        Hypothyroidism with elevated TSH. Genitourinary: Negative. Musculoskeletal:         Chronic arthritis of the right knee. Skin: Negative. Allergic/Immunologic: Negative. Neurological: Negative. Hematological: Negative. Psychiatric/Behavioral: Negative. Prior to Visit Medications    Medication Sig Taking?  Authorizing Provider   lisinopril-hydroCHLOROthiazide (PRINZIDE;ZESTORETIC) 20-25 MG per tablet Take 1 tablet by mouth daily Yes Chica Johnson MD   levothyroxine (SYNTHROID) 125 MCG tablet Take 1 tablet by mouth daily Yes Chica Johnson MD   meloxicam (MOBIC) 15 MG tablet Take 1 tablet by mouth daily as needed for Pain Yes HUMZA Mosley - CNP   promethazine (PHENERGAN) 12.5 MG tablet Take 1 tablet by mouth every 8 hours as needed for Nausea  Patient not taking: Reported on 2023  Chica Johnson MD   cyclobenzaprine (FLEXERIL) 5 MG tablet Take 1 tablet by mouth as needed for Muscle spasms  Patient not taking: Reported on 2023  Chica Johnson MD   lidocaine (LMX) 4 % cream APPLY TOPICALLY 3

## 2023-11-10 RX ORDER — LISINOPRIL AND HYDROCHLOROTHIAZIDE 25; 20 MG/1; MG/1
1 TABLET ORAL DAILY
Qty: 90 TABLET | Refills: 1 | Status: SHIPPED | OUTPATIENT
Start: 2023-11-10

## 2023-11-10 RX ORDER — MELOXICAM 15 MG/1
15 TABLET ORAL DAILY PRN
Qty: 30 TABLET | Refills: 0 | Status: SHIPPED | OUTPATIENT
Start: 2023-11-10

## 2023-11-10 RX ORDER — LEVOTHYROXINE SODIUM 0.12 MG/1
125 TABLET ORAL DAILY
Qty: 90 TABLET | Refills: 1 | Status: SHIPPED | OUTPATIENT
Start: 2023-11-10

## 2023-11-10 NOTE — TELEPHONE ENCOUNTER
Pt states she saw you 4 weeks ago and stated she did not need her 3:30 appointment  11/13 with you. But she needs medication refills.

## 2024-04-11 ENCOUNTER — OFFICE VISIT (OUTPATIENT)
Dept: PRIMARY CARE CLINIC | Age: 54
End: 2024-04-11
Payer: COMMERCIAL

## 2024-04-11 VITALS
SYSTOLIC BLOOD PRESSURE: 122 MMHG | HEART RATE: 62 BPM | DIASTOLIC BLOOD PRESSURE: 75 MMHG | TEMPERATURE: 98.1 F | OXYGEN SATURATION: 96 % | WEIGHT: 281 LBS | BODY MASS INDEX: 49.78 KG/M2

## 2024-04-11 DIAGNOSIS — E78.2 MIXED HYPERLIPIDEMIA: ICD-10-CM

## 2024-04-11 DIAGNOSIS — G47.33 OBSTRUCTIVE SLEEP APNEA SYNDROME: ICD-10-CM

## 2024-04-11 DIAGNOSIS — E03.9 ACQUIRED HYPOTHYROIDISM: ICD-10-CM

## 2024-04-11 DIAGNOSIS — M25.561 CHRONIC PAIN OF RIGHT KNEE: Primary | ICD-10-CM

## 2024-04-11 DIAGNOSIS — I10 ESSENTIAL HYPERTENSION: ICD-10-CM

## 2024-04-11 DIAGNOSIS — G89.29 CHRONIC PAIN OF RIGHT KNEE: Primary | ICD-10-CM

## 2024-04-11 LAB
ALBUMIN SERPL-MCNC: 4.4 G/DL (ref 3.5–5.2)
ALP BLD-CCNC: 51 U/L (ref 35–104)
ALT SERPL-CCNC: 19 U/L (ref 0–32)
ANION GAP SERPL CALCULATED.3IONS-SCNC: 15 MMOL/L (ref 7–16)
AST SERPL-CCNC: 21 U/L (ref 0–31)
BILIRUB SERPL-MCNC: 0.3 MG/DL (ref 0–1.2)
BUN BLDV-MCNC: 14 MG/DL (ref 6–20)
CALCIUM SERPL-MCNC: 9.8 MG/DL (ref 8.6–10.2)
CHLORIDE BLD-SCNC: 102 MMOL/L (ref 98–107)
CHOLESTEROL: 247 MG/DL
CO2: 23 MMOL/L (ref 22–29)
CREAT SERPL-MCNC: 1 MG/DL (ref 0.5–1)
GFR SERPL CREATININE-BSD FRML MDRD: 70 ML/MIN/1.73M2
GLUCOSE BLD-MCNC: 91 MG/DL (ref 74–99)
HDLC SERPL-MCNC: 52 MG/DL
LDL CHOLESTEROL: 166 MG/DL
POTASSIUM SERPL-SCNC: 4.2 MMOL/L (ref 3.5–5)
SODIUM BLD-SCNC: 140 MMOL/L (ref 132–146)
TOTAL PROTEIN: 7.2 G/DL (ref 6.4–8.3)
TRIGL SERPL-MCNC: 147 MG/DL
TSH SERPL DL<=0.05 MIU/L-ACNC: 1.11 UIU/ML (ref 0.27–4.2)
VLDLC SERPL CALC-MCNC: 29 MG/DL

## 2024-04-11 PROCEDURE — 1036F TOBACCO NON-USER: CPT | Performed by: INTERNAL MEDICINE

## 2024-04-11 PROCEDURE — 99214 OFFICE O/P EST MOD 30 MIN: CPT | Performed by: INTERNAL MEDICINE

## 2024-04-11 PROCEDURE — 20610 DRAIN/INJ JOINT/BURSA W/O US: CPT | Performed by: INTERNAL MEDICINE

## 2024-04-11 PROCEDURE — G8417 CALC BMI ABV UP PARAM F/U: HCPCS | Performed by: INTERNAL MEDICINE

## 2024-04-11 PROCEDURE — 3074F SYST BP LT 130 MM HG: CPT | Performed by: INTERNAL MEDICINE

## 2024-04-11 PROCEDURE — 3078F DIAST BP <80 MM HG: CPT | Performed by: INTERNAL MEDICINE

## 2024-04-11 PROCEDURE — 3017F COLORECTAL CA SCREEN DOC REV: CPT | Performed by: INTERNAL MEDICINE

## 2024-04-11 PROCEDURE — G8427 DOCREV CUR MEDS BY ELIG CLIN: HCPCS | Performed by: INTERNAL MEDICINE

## 2024-04-11 RX ORDER — MELOXICAM 15 MG/1
15 TABLET ORAL DAILY PRN
Qty: 30 TABLET | Refills: 2 | Status: SHIPPED | OUTPATIENT
Start: 2024-04-11

## 2024-04-11 RX ORDER — TRIAMCINOLONE ACETONIDE 40 MG/ML
40 INJECTION, SUSPENSION INTRA-ARTICULAR; INTRAMUSCULAR ONCE
Status: COMPLETED | OUTPATIENT
Start: 2024-04-11 | End: 2024-04-11

## 2024-04-11 RX ORDER — LIDOCAINE HYDROCHLORIDE 10 MG/ML
5 INJECTION, SOLUTION INFILTRATION; PERINEURAL ONCE
Status: COMPLETED | OUTPATIENT
Start: 2024-04-11 | End: 2024-04-11

## 2024-04-11 RX ORDER — LEVOTHYROXINE SODIUM 0.12 MG/1
125 TABLET ORAL DAILY
Qty: 30 TABLET | Refills: 5 | Status: SHIPPED | OUTPATIENT
Start: 2024-04-11

## 2024-04-11 RX ORDER — LISINOPRIL AND HYDROCHLOROTHIAZIDE 25; 20 MG/1; MG/1
1 TABLET ORAL DAILY
Qty: 30 TABLET | Refills: 5 | Status: SHIPPED | OUTPATIENT
Start: 2024-04-11

## 2024-04-11 RX ADMIN — TRIAMCINOLONE ACETONIDE 40 MG: 40 INJECTION, SUSPENSION INTRA-ARTICULAR; INTRAMUSCULAR at 15:50

## 2024-04-11 RX ADMIN — LIDOCAINE HYDROCHLORIDE 5 ML: 10 INJECTION, SOLUTION INFILTRATION; PERINEURAL at 15:50

## 2024-04-11 ASSESSMENT — PATIENT HEALTH QUESTIONNAIRE - PHQ9
SUM OF ALL RESPONSES TO PHQ QUESTIONS 1-9: 0
1. LITTLE INTEREST OR PLEASURE IN DOING THINGS: NOT AT ALL
SUM OF ALL RESPONSES TO PHQ QUESTIONS 1-9: 0
SUM OF ALL RESPONSES TO PHQ9 QUESTIONS 1 & 2: 0
SUM OF ALL RESPONSES TO PHQ QUESTIONS 1-9: 0
SUM OF ALL RESPONSES TO PHQ QUESTIONS 1-9: 0
2. FEELING DOWN, DEPRESSED OR HOPELESS: NOT AT ALL

## 2024-04-11 ASSESSMENT — ENCOUNTER SYMPTOMS
APNEA: 1
EYES NEGATIVE: 1
GASTROINTESTINAL NEGATIVE: 1
ALLERGIC/IMMUNOLOGIC NEGATIVE: 1
WHEEZING: 0
SHORTNESS OF BREATH: 0

## 2024-04-11 NOTE — PROGRESS NOTES
2024    Rosina Portillo (:  1970) is a 53 y.o. female, here for evaluation of the following medical concerns:    Patient's done an extremely good job of losing weight over this last year.  It is help quite a bit in terms of her knee pain.  Her blood pressure here is well-controlled.  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  Patient seems to be quite happy currently which is very pleasing to see.  She denies any cardiovascular or respiratory complaints.  She denies any GI or  symptomatology.  Her right knee does bother her quite a bit.  Injection therapy has been very helpful in the past.  She has a history of hypothyroidism but seems to be clinically euthyroid currently.    Hypertension  Associated symptoms include anxiety. Pertinent negatives include no shortness of breath.   Anxiety  Patient reports no shortness of breath.       Knee Pain     Weight Management          Review of Systems   Constitutional: Negative.    HENT: Negative.     Eyes: Negative.    Respiratory:  Positive for apnea. Negative for shortness of breath and wheezing.    Cardiovascular: Negative.    Gastrointestinal: Negative.    Endocrine:        Hypothyroidism with elevated TSH.   Genitourinary: Negative.    Musculoskeletal:         Chronic arthritis of the right knee.   Skin: Negative.    Allergic/Immunologic: Negative.    Neurological: Negative.    Hematological: Negative.    Psychiatric/Behavioral: Negative.         Prior to Visit Medications    Medication Sig Taking? Authorizing Provider   levothyroxine (SYNTHROID) 125 MCG tablet Take 1 tablet by mouth daily Yes Adrian Gibbs MD   lisinopril-hydroCHLOROthiazide (PRINZIDE;ZESTORETIC) 20-25 MG per tablet Take 1 tablet by mouth daily Yes Adrian Gibbs MD   meloxicam (MOBIC) 15 MG tablet Take 1 tablet by mouth daily as needed for Pain Yes Adrian Gibbs MD   cyclobenzaprine (FLEXERIL) 5 MG tablet Take 1 tablet by mouth as needed for

## 2024-08-23 ENCOUNTER — TELEPHONE (OUTPATIENT)
Dept: PRIMARY CARE CLINIC | Age: 54
End: 2024-08-23

## 2024-08-23 ENCOUNTER — OFFICE VISIT (OUTPATIENT)
Dept: ORTHOPEDIC SURGERY | Age: 54
End: 2024-08-23

## 2024-08-23 VITALS — BODY MASS INDEX: 47.84 KG/M2 | WEIGHT: 270 LBS | HEIGHT: 63 IN

## 2024-08-23 DIAGNOSIS — M17.11 PRIMARY OSTEOARTHRITIS OF RIGHT KNEE: Primary | ICD-10-CM

## 2024-08-23 RX ORDER — TRIAMCINOLONE ACETONIDE 40 MG/ML
40 INJECTION, SUSPENSION INTRA-ARTICULAR; INTRAMUSCULAR ONCE
Status: COMPLETED | OUTPATIENT
Start: 2024-08-23 | End: 2024-08-23

## 2024-08-23 RX ORDER — LIDOCAINE HYDROCHLORIDE 10 MG/ML
4 INJECTION, SOLUTION INFILTRATION; PERINEURAL ONCE
Status: COMPLETED | OUTPATIENT
Start: 2024-08-23 | End: 2024-08-23

## 2024-08-23 RX ORDER — MELOXICAM 15 MG/1
15 TABLET ORAL DAILY
Qty: 30 TABLET | Refills: 3 | Status: SHIPPED | OUTPATIENT
Start: 2024-08-23

## 2024-08-23 RX ADMIN — LIDOCAINE HYDROCHLORIDE 4 ML: 10 INJECTION, SOLUTION INFILTRATION; PERINEURAL at 16:33

## 2024-08-23 RX ADMIN — TRIAMCINOLONE ACETONIDE 40 MG: 40 INJECTION, SUSPENSION INTRA-ARTICULAR; INTRAMUSCULAR at 16:34

## 2024-08-23 NOTE — PROGRESS NOTES
[facility]  New Patient Note      CHIEF COMPLAINT:   Chief Complaint   Patient presents with    Knee Pain     Pt presents this PM with c/o pain in her R knee. Is interested in receiving an injection today. Last injection was given to her in April by PCP. Pain is in medial knee. Is taking Advil to manage her symptoms.        HISTORY OF PRESENT ILLNESS:                The patient is a 54 y.o. female who presents today with complaints of acute on chronic right knee pain.  She has been receiving intra-articular cortisone injections with her PCP for the last 2 years while she works on her BMI.  She is aware she does need a right total knee replacement.  Dr. Gibbs's office referred her to us for intra-articular cortisone injection today..  Pt localizes the pain to medial aspect of the knee.  Pt denies any numbness, tingling, loss of sensation or radiation of symptoms into toes.  Pain is worse with ambulation, position changes.  They have tried at home therapies of meloxicam, Aleve, Caio, Tylenol, physical therapy.  Pt has never injured this knee in the past.      Past Medical History:        Diagnosis Date    Acquired hypothyroidism     Arthritis 2018    Class 3 severe obesity due to excess calories without serious comorbidity with body mass index (BMI) of 50.0 to 59.9 in adult (HCC)     Essential hypertension     H/O breast biopsy 2019     Past Surgical History:        Procedure Laterality Date    INDUCED       WISDOM TOOTH EXTRACTION       Current Medications:   No current facility-administered medications for this visit.  Allergies:  Amoxicillin and Penicillin g    Social History:   TOBACCO:   reports that she has never smoked. She has never used smokeless tobacco.  ETOH:   reports that she does not currently use alcohol after a past usage of about 7.0 standard drinks of alcohol per week.  DRUGS:   reports no history of drug use.    Review of Systems   Constitutional:

## 2024-08-23 NOTE — TELEPHONE ENCOUNTER
Last Appointment:  4/11/2024  Future Appointments   Date Time Provider Department Center   10/10/2024  3:30 PM Adrian Gibbs MD [facility]      Patient called to get an appointment for knee injection.  She had been having issues with pain and not being able to walk long distances.  Advised ortho walk in today and provided directions.    Also patient is asking for a handicap placard that she can use for long distance walking.  If you are agreeable patient would like to  order today while she is at the ortho walk in.    Electronically signed by Dora Morales LPN on 8/23/2024 at 9:31 AM

## 2024-12-09 RX ORDER — LISINOPRIL AND HYDROCHLOROTHIAZIDE 20; 25 MG/1; MG/1
1 TABLET ORAL DAILY
Qty: 30 TABLET | Refills: 0 | Status: SHIPPED
Start: 2024-12-09 | End: 2024-12-12 | Stop reason: SDUPTHER

## 2024-12-10 RX ORDER — LISINOPRIL AND HYDROCHLOROTHIAZIDE 20; 25 MG/1; MG/1
1 TABLET ORAL DAILY
Qty: 30 TABLET | Refills: 0 | OUTPATIENT
Start: 2024-12-10

## 2024-12-11 NOTE — TELEPHONE ENCOUNTER
Pt called stating [facility] never received this refill request.   I called in a verbal and was told they will fill it.

## 2024-12-12 RX ORDER — LISINOPRIL AND HYDROCHLOROTHIAZIDE 20; 25 MG/1; MG/1
1 TABLET ORAL DAILY
Qty: 30 TABLET | Refills: 1 | Status: SHIPPED | OUTPATIENT
Start: 2024-12-12

## 2024-12-12 NOTE — TELEPHONE ENCOUNTER
Patient called stating the pharmacy escript was down when her lisinopril was sent in and they do not have it.  She needs this script sent again asap.

## 2025-01-24 ENCOUNTER — OFFICE VISIT (OUTPATIENT)
Dept: FAMILY MEDICINE CLINIC | Age: 55
End: 2025-01-24

## 2025-01-24 VITALS
OXYGEN SATURATION: 98 % | DIASTOLIC BLOOD PRESSURE: 80 MMHG | BODY MASS INDEX: 47.12 KG/M2 | WEIGHT: 266 LBS | TEMPERATURE: 97 F | HEART RATE: 58 BPM | SYSTOLIC BLOOD PRESSURE: 130 MMHG

## 2025-01-24 DIAGNOSIS — I10 ESSENTIAL HYPERTENSION: Primary | ICD-10-CM

## 2025-01-24 DIAGNOSIS — E78.2 MIXED HYPERLIPIDEMIA: ICD-10-CM

## 2025-01-24 DIAGNOSIS — E66.01 CLASS 3 SEVERE OBESITY DUE TO EXCESS CALORIES WITH SERIOUS COMORBIDITY AND BODY MASS INDEX (BMI) OF 45.0 TO 49.9 IN ADULT: ICD-10-CM

## 2025-01-24 DIAGNOSIS — E66.813 CLASS 3 SEVERE OBESITY DUE TO EXCESS CALORIES WITH SERIOUS COMORBIDITY AND BODY MASS INDEX (BMI) OF 45.0 TO 49.9 IN ADULT: ICD-10-CM

## 2025-01-24 DIAGNOSIS — E03.9 ACQUIRED HYPOTHYROIDISM: ICD-10-CM

## 2025-01-24 DIAGNOSIS — I10 ESSENTIAL HYPERTENSION: ICD-10-CM

## 2025-01-24 LAB
ALBUMIN: 4.2 G/DL (ref 3.5–5.2)
ALP BLD-CCNC: 58 U/L (ref 35–104)
ALT SERPL-CCNC: 21 U/L (ref 0–32)
ANION GAP SERPL CALCULATED.3IONS-SCNC: 12 MMOL/L (ref 7–16)
AST SERPL-CCNC: 18 U/L (ref 0–31)
BASOPHILS ABSOLUTE: 0.05 K/UL (ref 0–0.2)
BASOPHILS RELATIVE PERCENT: 1 % (ref 0–2)
BILIRUB SERPL-MCNC: 0.5 MG/DL (ref 0–1.2)
BUN BLDV-MCNC: 16 MG/DL (ref 6–20)
CALCIUM SERPL-MCNC: 9.8 MG/DL (ref 8.6–10.2)
CHLORIDE BLD-SCNC: 102 MMOL/L (ref 98–107)
CHOLESTEROL, TOTAL: 265 MG/DL
CO2: 27 MMOL/L (ref 22–29)
CREAT SERPL-MCNC: 0.7 MG/DL (ref 0.5–1)
EOSINOPHILS ABSOLUTE: 0.15 K/UL (ref 0.05–0.5)
EOSINOPHILS RELATIVE PERCENT: 3 % (ref 0–6)
GFR, ESTIMATED: >90 ML/MIN/1.73M2
GLUCOSE BLD-MCNC: 96 MG/DL (ref 74–99)
HCT VFR BLD CALC: 45.4 % (ref 34–48)
HDLC SERPL-MCNC: 62 MG/DL
HEMOGLOBIN: 14.4 G/DL (ref 11.5–15.5)
IMMATURE GRANULOCYTES %: 1 % (ref 0–5)
IMMATURE GRANULOCYTES ABSOLUTE: 0.03 K/UL (ref 0–0.58)
LDL CHOLESTEROL: 177 MG/DL
LYMPHOCYTES ABSOLUTE: 1.78 K/UL (ref 1.5–4)
LYMPHOCYTES RELATIVE PERCENT: 35 % (ref 20–42)
MCH RBC QN AUTO: 31 PG (ref 26–35)
MCHC RBC AUTO-ENTMCNC: 31.7 G/DL (ref 32–34.5)
MCV RBC AUTO: 97.8 FL (ref 80–99.9)
MONOCYTES ABSOLUTE: 0.4 K/UL (ref 0.1–0.95)
MONOCYTES RELATIVE PERCENT: 8 % (ref 2–12)
NEUTROPHILS ABSOLUTE: 2.7 K/UL (ref 1.8–7.3)
NEUTROPHILS RELATIVE PERCENT: 53 % (ref 43–80)
PDW BLD-RTO: 13.2 % (ref 11.5–15)
PLATELET, FLUORESCENCE: 187 K/UL (ref 130–450)
PMV BLD AUTO: 12.8 FL (ref 7–12)
POTASSIUM SERPL-SCNC: 4.2 MMOL/L (ref 3.5–5)
RBC # BLD: 4.64 M/UL (ref 3.5–5.5)
SODIUM BLD-SCNC: 141 MMOL/L (ref 132–146)
TOTAL PROTEIN: 6.9 G/DL (ref 6.4–8.3)
TRIGL SERPL-MCNC: 129 MG/DL
TSH SERPL DL<=0.05 MIU/L-ACNC: 2.08 UIU/ML (ref 0.27–4.2)
VLDLC SERPL CALC-MCNC: 26 MG/DL
WBC # BLD: 5.1 K/UL (ref 4.5–11.5)

## 2025-01-24 RX ORDER — LISINOPRIL AND HYDROCHLOROTHIAZIDE 20; 25 MG/1; MG/1
1 TABLET ORAL DAILY
Qty: 90 TABLET | Refills: 2 | Status: SHIPPED | OUTPATIENT
Start: 2025-01-24

## 2025-01-24 RX ORDER — LEVOTHYROXINE SODIUM 125 UG/1
125 TABLET ORAL DAILY
Qty: 90 TABLET | Refills: 2 | Status: SHIPPED | OUTPATIENT
Start: 2025-01-24

## 2025-01-24 RX ORDER — MELOXICAM 15 MG/1
15 TABLET ORAL DAILY PRN
Qty: 30 TABLET | Refills: 2 | Status: SHIPPED | OUTPATIENT
Start: 2025-01-24

## 2025-01-24 ASSESSMENT — ENCOUNTER SYMPTOMS
GASTROINTESTINAL NEGATIVE: 1
ALLERGIC/IMMUNOLOGIC NEGATIVE: 1
APNEA: 1
EYES NEGATIVE: 1
SHORTNESS OF BREATH: 0
WHEEZING: 0

## 2025-01-24 NOTE — PROGRESS NOTES
2025    Rosina Portillo (:  1970) is a 54 y.o. female, here for evaluation of the following medical concerns:    Patient has done well over the last year.  The patient is clinically euthyroid.  TSH will be done today.  Patient did have injection therapy of her knee earlier in the year.  This has been of great benefit for her.  She does have significant arthritis especially of the right knee.  Patient and I did discuss weight loss today.  I did ask her to inquire regarding Zepbound from her insurance company.  If she could have assessment that would indicate sleep apnea then Zepbound would be indicated.  Patient is refusing colon screening or mammography.  Last mammogram was in .  Patient denies cardiac or respiratory symptoms including anginal equivalents.  She denies any headache or neurologic issues.  She denies respiratory problems.  She continues to be in the any.  She will need a Tdap in  as her employer is going to be having another baby.  Patient will be given Prevnar vaccination today.  She did have a flu vaccine and I have advised COVID vaccination.    Hypertension  Associated symptoms include anxiety. Pertinent negatives include no shortness of breath.   Anxiety  Patient reports no shortness of breath.       Knee Pain     Weight Management          Review of Systems   Constitutional: Negative.    HENT: Negative.     Eyes: Negative.    Respiratory:  Positive for apnea. Negative for shortness of breath and wheezing.    Cardiovascular: Negative.    Gastrointestinal: Negative.    Endocrine:        Hypothyroidism with elevated TSH.   Genitourinary: Negative.    Musculoskeletal:         Chronic arthritis of the right knee.   Skin: Negative.    Allergic/Immunologic: Negative.    Neurological: Negative.    Hematological: Negative.    Psychiatric/Behavioral: Negative.         Prior to Visit Medications    Medication Sig Taking? Authorizing Provider   lisinopril-hydroCHLOROthiazide

## 2025-02-05 ENCOUNTER — TELEPHONE (OUTPATIENT)
Dept: FAMILY MEDICINE CLINIC | Age: 55
End: 2025-02-05

## 2025-02-05 DIAGNOSIS — E78.2 MIXED HYPERLIPIDEMIA: Primary | ICD-10-CM

## 2025-02-05 RX ORDER — ROSUVASTATIN CALCIUM 20 MG/1
20 TABLET, COATED ORAL DAILY
Qty: 90 TABLET | Refills: 1 | Status: SHIPPED | OUTPATIENT
Start: 2025-02-05

## 2025-02-05 RX ORDER — ROSUVASTATIN CALCIUM 10 MG/1
10 TABLET, COATED ORAL DAILY
Qty: 90 TABLET | Refills: 1 | Status: SHIPPED
Start: 2025-02-05 | End: 2025-02-05

## 2025-02-12 ENCOUNTER — TELEPHONE (OUTPATIENT)
Dept: PRIMARY CARE CLINIC | Age: 55
End: 2025-02-12

## 2025-02-12 NOTE — TELEPHONE ENCOUNTER
Patient called back stating she is unable to take the cholesterol medication.  She said that she tried it for 4 days and her muscles hurt so bad she is unable to lift her arms.  She did stop it and wants to know what she should do next.   Please advise.

## 2025-02-12 NOTE — TELEPHONE ENCOUNTER
Per Dr Gibbs,   \"If she has not tried red rice yeast tell her to trial this at 1800 mg a day.  This is an over-the-counter medication \"     Pt called regarding this and was notified of instructions.   She states she will pick this up later today.

## 2025-02-14 ENCOUNTER — OFFICE VISIT (OUTPATIENT)
Dept: ORTHOPEDIC SURGERY | Age: 55
End: 2025-02-14

## 2025-02-14 VITALS
TEMPERATURE: 97 F | BODY MASS INDEX: 47.48 KG/M2 | HEIGHT: 63 IN | HEART RATE: 71 BPM | WEIGHT: 268 LBS | OXYGEN SATURATION: 99 %

## 2025-02-14 DIAGNOSIS — M17.11 PRIMARY OSTEOARTHRITIS OF RIGHT KNEE: Primary | ICD-10-CM

## 2025-02-14 RX ORDER — TRIAMCINOLONE ACETONIDE 40 MG/ML
40 INJECTION, SUSPENSION INTRA-ARTICULAR; INTRAMUSCULAR ONCE
Status: COMPLETED | OUTPATIENT
Start: 2025-02-14 | End: 2025-02-14

## 2025-02-14 RX ORDER — LIDOCAINE HYDROCHLORIDE 10 MG/ML
4 INJECTION, SOLUTION INFILTRATION; PERINEURAL ONCE
Status: COMPLETED | OUTPATIENT
Start: 2025-02-14 | End: 2025-02-14

## 2025-02-14 RX ADMIN — TRIAMCINOLONE ACETONIDE 40 MG: 40 INJECTION, SUSPENSION INTRA-ARTICULAR; INTRAMUSCULAR at 11:20

## 2025-02-14 RX ADMIN — LIDOCAINE HYDROCHLORIDE 4 ML: 10 INJECTION, SOLUTION INFILTRATION; PERINEURAL at 11:19

## 2025-02-14 NOTE — PROGRESS NOTES
Established Patient Follow Up  [facility]    Chief Complaint   Patient presents with    Knee Pain     Pt presents this AM with c/o pain in her R knee. Is interested in having an injection today. Was last seen for this issue on 8/23/2024.        Subjective:  Pt is a 54 y.o. female, established pt who presents today for follow up of right knee arthritis.  Please refer to the last clinic note from 08/23/24 as none of the patient's past medical hx has changed.  Pt reports last injection provided substantial relief of symptoms until recently.  There has been no new injury.      Objective:  There were no vitals filed for this visit.    Pt is alert and oriented x 3, NAD, sitting comfortable in the exam room today.  Painful and decreased ROM as compared to contralateral side.  Normal patellar tracking.    Radiology Imaging:  No new imaging at today's visit     Procedure:  Procedure Note: right Knee Cortisone injection     The right Knee was identified as the injection site. The risk and benefits of a cortisone injection were explained and the patient consented to the injection. Under sterile conditions, using ethyl chloride spray , right knee was injected with a mixture of 40 mg of Kenalog and 4 mL of 1% Lidocaine without complication. A sterile bandage was applied.  The patient tolerated the procedure well.  Pt reported improvement of pain and ROM following the procedure.    Assessment:  Encounter Diagnosis   Name Primary?    Primary osteoarthritis of right knee Yes       Plan:  Pt returns to the clinic today for consider of right intra-articular knee injection.  Pt last injection was 8/23/24.  It did provide pt lasting relief until recently, no new injury.  After obtaining verbal consent, IA Cortisone injection was given and pt tolerated the procedure well.  I will see pt back on PRN basis.  As a reminder, we cannot do another IA Cortisone injection in this joint for 3-4 months.       Electronically

## 2025-06-06 ENCOUNTER — OFFICE VISIT (OUTPATIENT)
Dept: ORTHOPEDIC SURGERY | Age: 55
End: 2025-06-06

## 2025-06-06 VITALS — HEIGHT: 63 IN | BODY MASS INDEX: 47.47 KG/M2

## 2025-06-06 DIAGNOSIS — M17.11 PRIMARY OSTEOARTHRITIS OF RIGHT KNEE: Primary | ICD-10-CM

## 2025-06-06 RX ORDER — TRIAMCINOLONE ACETONIDE 40 MG/ML
40 INJECTION, SUSPENSION INTRA-ARTICULAR; INTRAMUSCULAR ONCE
Status: COMPLETED | OUTPATIENT
Start: 2025-06-06 | End: 2025-06-06

## 2025-06-06 RX ORDER — MELOXICAM 15 MG/1
15 TABLET ORAL DAILY
Qty: 30 TABLET | Refills: 3 | Status: SHIPPED | OUTPATIENT
Start: 2025-06-06

## 2025-06-06 RX ORDER — LIDOCAINE 40 MG/G
CREAM TOPICAL
Qty: 60 G | Refills: 0 | Status: SHIPPED | OUTPATIENT
Start: 2025-06-06

## 2025-06-06 RX ORDER — LIDOCAINE HYDROCHLORIDE 10 MG/ML
4 INJECTION, SOLUTION INFILTRATION; PERINEURAL ONCE
Status: COMPLETED | OUTPATIENT
Start: 2025-06-06 | End: 2025-06-06

## 2025-06-06 RX ADMIN — LIDOCAINE HYDROCHLORIDE 4 ML: 10 INJECTION, SOLUTION INFILTRATION; PERINEURAL at 10:25

## 2025-06-06 RX ADMIN — TRIAMCINOLONE ACETONIDE 40 MG: 40 INJECTION, SUSPENSION INTRA-ARTICULAR; INTRAMUSCULAR at 10:25

## 2025-06-06 NOTE — PROGRESS NOTES
Established Patient Follow Up  [facility]    No chief complaint on file.      Subjective:  Pt is a 54 y.o. female, established pt who presents today for follow up of right knee arthritis.  Please refer to the last clinic note from 2/14/25 as none of the patient's past medical hx has changed.  Pt reports last injection provided substantial relief of symptoms until recently.  There has been no new injury.      Objective:  There were no vitals filed for this visit.    Pt is alert and oriented x 3, NAD, sitting comfortable in the exam room today.  Painful and decreased ROM as compared to contralateral side.  Normal patellar tracking.    Radiology Imaging:  No new imaging at today's visit     Procedure:  Procedure Note: Right knee Cortisone injection     The right knee was identified as the injection site. The risk and benefits of a cortisone injection were explained and the patient consented to the injection. Under sterile conditions, using ethyl chloride spray , right knee was injected with a mixture of 40 mg of Kenalog and 4 mL of 1% Lidocaine without complication. A sterile bandage was applied.  The patient tolerated the procedure well.  Pt reported improvement of pain and ROM following the procedure.      Assessment:  Encounter Diagnosis   Name Primary?    Primary osteoarthritis of right knee Yes         Plan:  Pt returns to the clinic today for consider of right intra-articular knee injection.  Pt last injection was 2/14/25.  It did provide pt lasting relief until recently, no new injury.  After obtaining verbal consent, IA Cortisone injection was given and pt tolerated the procedure well.  I will see pt back on PRN basis.  As a reminder, we cannot do another IA Cortisone injection in this joint for 3-4 months.       Electronically signed by DEVIKA RODAS PA-C on 6/6/25 at 9:57 AM EDT    **This report was transcribed using voice recognition software. Every effort was made to ensure accuracy;